# Patient Record
Sex: FEMALE | Race: BLACK OR AFRICAN AMERICAN | Employment: FULL TIME | URBAN - METROPOLITAN AREA
[De-identification: names, ages, dates, MRNs, and addresses within clinical notes are randomized per-mention and may not be internally consistent; named-entity substitution may affect disease eponyms.]

---

## 2017-04-17 ENCOUNTER — HOSPITAL ENCOUNTER (OUTPATIENT)
Dept: MAMMOGRAPHY | Age: 57
Discharge: HOME | End: 2017-04-17
Attending: FAMILY MEDICINE
Payer: COMMERCIAL

## 2017-04-17 DIAGNOSIS — Z12.31 VISIT FOR SCREENING MAMMOGRAM: ICD-10-CM

## 2017-04-17 PROCEDURE — 77067 SCR MAMMO BI INCL CAD: CPT

## 2017-05-22 ENCOUNTER — OFFICE VISIT (OUTPATIENT)
Dept: NEUROLOGY | Age: 57
End: 2017-05-22

## 2017-05-22 VITALS
HEART RATE: 84 BPM | SYSTOLIC BLOOD PRESSURE: 110 MMHG | HEIGHT: 63 IN | OXYGEN SATURATION: 98 % | BODY MASS INDEX: 25.87 KG/M2 | DIASTOLIC BLOOD PRESSURE: 60 MMHG | WEIGHT: 146 LBS

## 2017-05-22 DIAGNOSIS — G62.9 NEUROPATHY: ICD-10-CM

## 2017-05-22 DIAGNOSIS — G62.9 NEUROPATHY: Primary | ICD-10-CM

## 2017-05-22 RX ORDER — GABAPENTIN 100 MG/1
100 CAPSULE ORAL 3 TIMES DAILY
Qty: 90 CAP | Refills: 3 | Status: SHIPPED | OUTPATIENT
Start: 2017-05-22 | End: 2017-09-05 | Stop reason: DRUGHIGH

## 2017-05-22 NOTE — MR AVS SNAPSHOT
Visit Information Date & Time Provider Department Dept. Phone Encounter #  
 5/22/2017 10:00 AM Anne Vásquez MD Neurology Clinic at [facility] [phone] [id number] Follow-up Instructions Return in about 3 months (around 8/22/2017). Your Appointments 5/22/2017 10:00 AM  
New Patient with Anne Vásquez MD  
Neurology Clinic at [facility]: np. ..neuropathy. Sander Werner; np. ..neuropathy. Jo Galarza 1957 [facility], 
[address] [place] [address], [address] [phone]  
[address] [facility], [address] [place] [address], [address] [address] Upcoming Health Maintenance Date Due Hepatitis C Screening 1960 Pneumococcal 19-64 Highest Risk (1 of 3 - PCV13) 3/8/1977 DTaP/Tdap/Td series (1 - Tdap) 3/8/1979 PAP AKA CERVICAL CYTOLOGY 3/8/1979 FOBT Q 1 YEAR AGE 50-75 3/8/2008 INFLUENZA AGE 9 TO ADULT 8/1/2017 BREAST CANCER SCRN MAMMOGRAM 4/17/2019 Allergies as of 5/22/2017  Review Complete On: 5/22/2017 By: Anne Vásquez MD  
 No Known Allergies Current Immunizations  Never Reviewed No immunizations on file. Not reviewed this visit You Were Diagnosed With   
  
 Codes Comments Neuropathy    -  Primary ICD-10-CM: G62.9 ICD-9-CM: 104. 9 Vitals BP Pulse Height(growth percentile) Weight(growth percentile) SpO2 BMI  
 110/60 84 5' 3\" (1.6 m) 146 lb (66.2 kg) 98% 25.86 kg/m2 OB Status Smoking Status Hysterectomy Never Smoker Vitals History BMI and BSA Data Body Mass Index Body Surface Area  
 25.86 kg/m 2 1.72 m 2 Preferred Pharmacy Pharmacy Name Phone [phone], [address] [phone] Your Updated Medication List  
  
   
This list is accurate as of: 5/22/17  9:53 AM.  Always use your most recent med list.  
  
  
  
  
 gabapentin 100 mg capsule Commonly known as:  NEURONTIN Take 1 Cap by mouth three (3) times daily. Prescriptions Printed Refills  
 gabapentin (NEURONTIN) 100 mg capsule 3 Sig: Take 1 Cap by mouth three (3) times daily. Class: Print Route: Oral  
  
We Performed the Following CELL COUNT, CSF [id number] CPT(R)] CULTURE, BODY FLUID Dorrine Albert STAIN [92034 CPT(R)] PROTEIN, CSF [id number] CPT(R)] VDRL, CSF [id number] CPT(R)] Follow-up Instructions Return in about 3 months (around 8/22/2017). To-Do List   
 05/22/2017 Lab:  CELL COUNT, CSF   
  
 05/23/2017 Lab:  GLUCOSE, CSF   
  
 05/23/2017 Imaging:  XR SPINAL PUNC LUMB DX Referral Information Referral ID Referred By Referred To  
  
 [id number] Senait Azevedo Not Available Visits Status Start Date End Date 1 New Request 5/22/17 5/22/18 If your referral has a status of pending review or denied, additional information will be sent to support the outcome of this decision. Patient Instructions PRESCRIPTION REFILL POLICY [facility] Statement to Patients April 1, 2014 In an effort to ensure the large volume of patient prescription refills is processed in the most efficient and expeditious manner, we are asking our patients to assist us by calling your Pharmacy for all prescription refills, this will include also your  Mail Order Pharmacy. The pharmacy will contact our office electronically to continue the refill process. Please do not wait until the last minute to call your pharmacy. We need at least 48 hours (2days) to fill prescriptions. We also encourage you to call your pharmacy before going to  your prescription to make sure it is ready.   
 
With regard to controlled substance prescription refill requests (narcotic refills) that need to be picked up at our office, we ask your cooperation by providing us with at least 72 hours (3days) notice that you will need a refill. We will not refill narcotic prescription refill requests after 4:00pm on any weekday, Monday through Thursday, or after 2:00pm on Fridays, or on the weekends. We encourage everyone to explore another way of getting your prescription refill request processed using DesignLine, our patient web portal through our electronic medical record system. DesignLine is an efficient and effective way to communicate your medication request directly to the office and  downloadable as an dejan on your smart phone . DesignLine also features a review functionality that allows you to view your medication list as well as leave messages for your physician. Are you ready to get connected? If so please review the attatched instructions or speak to any of our staff to get you set up right away! Thank you so much for your cooperation. Should you have any questions please contact our Practice Administrator. The Physicians and Staff,  [facility] Introducing [place]! [facility] introduces DesignLine patient portal. Now you can access parts of your medical record, email your doctor's office, and request medication refills online. 1. In your internet browser, go to [email or web address] 2. Click on the First Time User? Click Here link in the Sign In box. You will see the New Member Sign Up page. 3. Enter your DesignLine Access Code exactly as it appears below. You will not need to use this code after youve completed the sign-up process. If you do not sign up before the expiration date, you must request a new code. · DesignLine Access Code: [email or web address] Expires: 6/21/2017  1:10 PM 
 
4. Enter the last four digits of your Social Security Number (xxxx) and Date of Birth (mm/dd/yyyy) as indicated and click Submit. You will be taken to the next sign-up page. 5. Create a ReplyBuy ID. This will be your ReplyBuy login ID and cannot be changed, so think of one that is secure and easy to remember. 6. Create a ReplyBuy password. You can change your password at any time. 7. Enter your Password Reset Question and Answer. This can be used at a later time if you forget your password. 8. Enter your e-mail address. You will receive e-mail notification when new information is available in [address] 9. Click Sign Up. You can now view and download portions of your medical record. 10. Click the Download Summary menu link to download a portable copy of your medical information. If you have questions, please visit the Frequently Asked Questions section of the ReplyBuy website. Remember, ReplyBuy is NOT to be used for urgent needs. For medical emergencies, dial 911. Now available from your iPhone and Android! Please provide this summary of care documentation to your next provider. Your primary care clinician is listed as 535Kenyetta Adames. If you have any questions after today's visit, please call [phone].

## 2017-05-22 NOTE — PATIENT INSTRUCTIONS
[facility]   Statement to Patients  April 1, 2014      In an effort to ensure the large volume of patient prescription refills is processed in the most efficient and expeditious manner, we are asking our patients to assist us by calling your Pharmacy for all prescription refills, this will include also your  Mail Order Pharmacy. The pharmacy will contact our office electronically to continue the refill process. Please do not wait until the last minute to call your pharmacy. We need at least 48 hours (2days) to fill prescriptions. We also encourage you to call your pharmacy before going to  your prescription to make sure it is ready. With regard to controlled substance prescription refill requests (narcotic refills) that need to be picked up at our office, we ask your cooperation by providing us with at least 72 hours (3days) notice that you will need a refill. We will not refill narcotic prescription refill requests after 4:00pm on any weekday, Monday through Thursday, or after 2:00pm on Fridays, or on the weekends. We encourage everyone to explore another way of getting your prescription refill request processed using BOSS Metrics, our patient web portal through our electronic medical record system. B[employer] Metrics is an efficient and effective way to communicate your medication request directly to the office and  downloadable as an dejan on your smart phone . B[employer] also features a review functionality that allows you to view your medication list as well as leave messages for your physician. Are you ready to get connected? If so please review the attatched instructions or speak to any of our staff to get you set up right away! Thank you so much for your cooperation. Should you have any questions please contact our Practice Administrator.     The Physicians and Staff,  [facility]

## 2017-05-22 NOTE — PROGRESS NOTES
HISTORY OF PRESENT ILLNESS  Philip Gay is a 59 y.o. female. Numbness   Pertinent negatives include no focal weakness, no loss of sensation, no loss of balance, no memory loss, no movement disorder and no mental status change. Neurologic Problem   The history is provided by the patient. This is a chronic problem. Episode onset: 4-5 years. The problem has been gradually worsening. Affected Side: Ams and legs, worse distally. Pertinent negatives include no focal weakness, no loss of sensation, no loss of balance, no memory loss, no movement disorder and no mental status change. There has been no fever. These pains can occur day or night but worse at night, they seem to be slowly worsening. Patient and daughter report no change in mentation    Review of Systems   Constitutional: Negative. Eyes: Negative. Cardiovascular: Negative. Gastrointestinal: Negative. Genitourinary: Negative. Skin: Negative. Neurological: Positive for numbness. Negative for dizziness, tingling, sensory change, speech change, focal weakness, seizures and loss of balance. Endo/Heme/Allergies: Negative. Psychiatric/Behavioral: Negative. Negative for memory loss. History reviewed. No pertinent past medical history. Family History   Problem Relation Age of Onset    Dementia Mother     Parkinsonism Mother     Migraines Father      Social History     Social History    Marital status:      Spouse name: N/A    Number of children: N/A    Years of education: N/A     Occupational History    Not on file. Social History Main Topics    Smoking status: Never Smoker    Smokeless tobacco: Never Used    Alcohol use No    Drug use: No    Sexual activity: No     Other Topics Concern    Not on file     Social History Narrative     Physical Exam   Constitutional: She appears well-developed and well-nourished. HENT:   Head: Normocephalic.    Eyes: Conjunctivae and EOM are normal. Pupils are equal, round, and reactive to light. Neck: Normal range of motion. Neck supple. Cardiovascular: Normal rate, regular rhythm and normal heart sounds. Pulmonary/Chest: Effort normal.   Musculoskeletal: Normal range of motion. Neurological: She is alert. No cranial nerve deficit or sensory deficit. Coordination and gait normal.   Reflex Scores:       Tricep reflexes are 2+ on the right side and 2+ on the left side. Bicep reflexes are 2+ on the right side and 2+ on the left side. Brachioradialis reflexes are 2+ on the right side and 2+ on the left side. Patellar reflexes are 3+ on the right side and 3+ on the left side. Achilles reflexes are 3+ on the right side and 3+ on the left side. Babinski is equivocal but abnormal bilaterally  Fundi normal  Carotids w/o bruit   Skin: Skin is warm and dry. Psychiatric: She has a normal mood and affect. Her behavior is normal. Judgment and thought content normal.       ASSESSMENT and PLAN  This patient may have non-specific neuropathic pains but the description of intermittent shooting pains i a patient with a history of syphillis is concerning. I will set her up for an LP in Radiology to check CSF protien, glucose, cell counts and VDRL. I will start her on gabapentin 100 tid to see if that altyers pains, F/U 2 months.

## 2017-05-23 DIAGNOSIS — G62.9 NEUROPATHY: ICD-10-CM

## 2017-07-28 ENCOUNTER — HOSPITAL ENCOUNTER (OUTPATIENT)
Dept: GENERAL RADIOLOGY | Age: 57
Discharge: HOME | End: 2017-07-28
Attending: PSYCHIATRY & NEUROLOGY
Payer: COMMERCIAL

## 2017-07-28 VITALS
RESPIRATION RATE: 16 BRPM | OXYGEN SATURATION: 98 % | DIASTOLIC BLOOD PRESSURE: 72 MMHG | TEMPERATURE: 98.4 F | SYSTOLIC BLOOD PRESSURE: 127 MMHG | HEART RATE: 56 BPM

## 2017-07-28 DIAGNOSIS — G62.9 NEUROPATHY: ICD-10-CM

## 2017-07-28 LAB
CHARACTER SMN: CLEAR
CHARACTER SMN: CLEAR
COLOR SPUN CSF: COLORLESS
COLOR SPUN CSF: COLORLESS
GLUCOSE CSF-MCNC: 43 MG/DL (ref 40–70)
PROT CSF-MCNC: 38 MG/DL (ref 15–45)
RBC # CSF: 2 /CU MM
RBC # CSF: 21 /CU MM
TUBE # CSF: 1
TUBE # CSF: 2
TUBE # CSF: 2
TUBE # CSF: 3
WBC # CSF: 1 /CU MM (ref 0–5)
WBC # CSF: 3 /CU MM (ref 0–5)

## 2017-07-28 PROCEDURE — 86592 SYPHILIS TEST NON-TREP QUAL: CPT | Performed by: PSYCHIATRY & NEUROLOGY

## 2017-07-28 PROCEDURE — 87205 SMEAR GRAM STAIN: CPT | Performed by: PSYCHIATRY & NEUROLOGY

## 2017-07-28 PROCEDURE — 82945 GLUCOSE OTHER FLUID: CPT | Performed by: PSYCHIATRY & NEUROLOGY

## 2017-07-28 PROCEDURE — 62270 DX LMBR SPI PNXR: CPT

## 2017-07-28 PROCEDURE — 89050 BODY FLUID CELL COUNT: CPT | Performed by: PSYCHIATRY & NEUROLOGY

## 2017-07-28 PROCEDURE — 84157 ASSAY OF PROTEIN OTHER: CPT | Performed by: PSYCHIATRY & NEUROLOGY

## 2017-07-28 NOTE — IP AVS SNAPSHOT
[address] [facility] 
[phone] Patient: Darcie Boyer MRN: [id number] MXI:0[birth year] Current Discharge Medication List  
  
ASK your doctor about these medications Dose & Instructions Dispensing Information Comments Morning Noon Evening Bedtime  
 gabapentin 100 mg capsule Commonly known as:  NEURONTIN Your last dose was: Your next dose is:    
   
   
 Dose:  100 mg Take 1 Cap by mouth three (3) times daily. Quantity:  90 Cap Refills:  3

## 2017-07-28 NOTE — IP AVS SNAPSHOT
[address] [facility] 
[phone] Patient: Tonie Moore MRN: [id number] ZUQ:[birth year] You are allergic to the following No active allergies Recent Documentation OB Status Smoking Status Hysterectomy Never Smoker Unresulted Labs Order Current Status CELL COUNT, CSF In process CELL COUNT, CSF In process GLUCOSE, CSF In process PROTEIN, CSF In process Emergency Contacts Name Discharge Info Relation Home Work Mobile [place], [place]     [phone] About your hospitalization You were admitted on:  July 28, 2017 You last received care in the:  [facility] RADIOLOGY You were discharged on:  July 28, 2017 Unit phone number:  [phone] Why you were hospitalized Your primary diagnosis was:  Not on File Providers Seen During Your Hospitalizations Provider Role Specialty Primary office phone Yoan Gray MD Attending Provider Neurology [phone] Your Primary Care Physician (PCP) Primary Care Physician Office Phone Office Fax Lisette Lombardo [phone] Follow-up Information None Your Appointments Monday August 21, 2017  8:40 AM EDT Follow Up with Yoan Gray MD  
Neurology Clinic at [address], 
[address] [place] [address], [address]  
[phone] Current Discharge Medication List  
  
ASK your doctor about these medications Dose & Instructions Dispensing Information Comments Morning Noon Evening Bedtime  
 gabapentin 100 mg capsule Commonly known as:  NEURONTIN Your last dose was: Your next dose is:    
   
   
 Dose:  100 mg Take 1 Cap by mouth three (3) times daily. Quantity:  90 Cap Refills:  3 Discharge Instructions Lumbar Puncture: After Your Visit Your Care Instructions A lumbar puncture (also called a spinal tap) is a test to check the fluid that surrounds and protects your spinal cord and brain. Your doctor may have done this test to look for an infection. In some cases, a lumbar puncture is done to release pressure from too much fluid or to look for diseases such as multiple sclerosis. The fluid that was taken is often sent to a lab for different tests. Your doctor may get some answers right away, but other answers take hours to days. Your doctor will call you with the results. You may feel tired or have a mild backache or a headache after the test. Some people have trouble sleeping for 1 or 2 days. Follow-up care is a key part of your treatment and safety. Be sure to make and go to all appointments, and call your doctor if you are having problems. Its also a good idea to know your test results and keep a list of the medicines you take. How can you care for yourself at home? · Drink plenty of liquids in the next few hours. This may prevent a headache or keep a headache from being severe. · Your doctor may tell you to lie flat in bed for 1 to 4 hours. This may prevent a headache. · Get plenty of rest. 
· If your doctor prescribed antibiotics, take them as directed. Do not stop taking them just because you feel better. You need to take the full course of antibiotics. · Take anti-inflammatory medicines to reduce a headache or backache. These include ibuprofen (Advil, Motrin) and naproxen (Aleve). Read and follow all instructions on the label. When should you call for help? Call your doctor now or seek immediate medical care if: 
· You have a fever with a stiff neck or a severe headache. · You have any drainage or bleeding from the site of the puncture. · You feel numb or lose strength below the puncture site.  
Watch closely for changes in your health, and be sure to contact your doctor if: 
· You do not get better as expected. Where can you learn more? Go to DealEt3arrafer.be Enter 40-88-52-31 in the search box to learn more about \"Lumbar Puncture: After Your Visit. \"  
© 9157-2720 Healthwise, Incorporated. Care instructions adapted under license by Eleni Claudio (which disclaims liability or warranty for this information). This care instruction is for use with your licensed healthcare professional. If you have questions about a medical condition or this instruction, always ask your healthcare professional. [address] any warranty or liability for your use of this information. Content Version: 7.3.436660; Last Revised: September 13, 2011 Discharge Orders None Introducing [address]! Eleni Claudio introduces PredictionIO patient portal. Now you can access parts of your medical record, email your doctor's office, and request medication refills online. 1. In your internet browser, go to [email or web address] 2. Click on the First Time User? Click Here link in the Sign In box. You will see the New Member Sign Up page. 3. Enter your PredictionIO Access Code exactly as it appears below. You will not need to use this code after youve completed the sign-up process. If you do not sign up before the expiration date, you must request a new code. · PredictionIO Access Code: [email or web address] Expires: 10/25/2017 11:18 AM 
 
4. Enter the last four digits of your Social Security Number (xxxx) and Date of Birth (mm/dd/yyyy) as indicated and click Submit. You will be taken to the next sign-up page. 5. Create a PredictionIO ID. This will be your PredictionIO login ID and cannot be changed, so think of one that is secure and easy to remember. 6. Create a PredictionIO password. You can change your password at any time. 7. Enter your Password Reset Question and Answer. This can be used at a later time if you forget your password. 8. Enter your e-mail address. You will receive e-mail notification when new information is available in [address] 9. Click Sign Up. You can now view and download portions of your medical record. 10. Click the Download Summary menu link to download a portable copy of your medical information. If you have questions, please visit the Frequently Asked Questions section of the KDS website. Remember, KDS is NOT to be used for urgent needs. For medical emergencies, dial 911. Now available from your iPhone and Android! General Information Please provide this summary of care documentation to your next provider. Patient Signature:  ____________________________________________________________ Date:  ____________________________________________________________  
  
[facility] Provider Signature:  ____________________________________________________________ Date:  ____________________________________________________________

## 2017-07-28 NOTE — ROUTINE PROCESS
0945- Pt in post LP. Drsg D/I to back. Labs drawn and sent. Discharge instructions reviewed with pt. Pt verbalized understanding. 1083- Discharged to home via W/C. Dischage instructions reviewed with Dtr.

## 2017-07-28 NOTE — DISCHARGE INSTRUCTIONS
Lumbar Puncture: After Your Visit  Your Care Instructions  A lumbar puncture (also called a spinal tap) is a test to check the fluid that surrounds and protects your spinal cord and brain. Your doctor may have done this test to look for an infection. In some cases, a lumbar puncture is done to release pressure from too much fluid or to look for diseases such as multiple sclerosis. The fluid that was taken is often sent to a lab for different tests. Your doctor may get some answers right away, but other answers take hours to days. Your doctor will call you with the results. You may feel tired or have a mild backache or a headache after the test. Some people have trouble sleeping for 1 or 2 days. Follow-up care is a key part of your treatment and safety. Be sure to make and go to all appointments, and call your doctor if you are having problems. Its also a good idea to know your test results and keep a list of the medicines you take. How can you care for yourself at home? · Drink plenty of liquids in the next few hours. This may prevent a headache or keep a headache from being severe. · Your doctor may tell you to lie flat in bed for 1 to 4 hours. This may prevent a headache. · Get plenty of rest.  · If your doctor prescribed antibiotics, take them as directed. Do not stop taking them just because you feel better. You need to take the full course of antibiotics. · Take anti-inflammatory medicines to reduce a headache or backache. These include ibuprofen (Advil, Motrin) and naproxen (Aleve). Read and follow all instructions on the label. When should you call for help? Call your doctor now or seek immediate medical care if:  · You have a fever with a stiff neck or a severe headache. · You have any drainage or bleeding from the site of the puncture. · You feel numb or lose strength below the puncture site.   Watch closely for changes in your health, and be sure to contact your doctor if:  · You do not get better as expected. Where can you learn more? Go to Venustech.be  Enter B775 in the search box to learn more about \"Lumbar Puncture: After Your Visit. \"   © 0915-4286 Healthwise, Incorporated. Care instructions adapted under license by Meera Elizabeth (which disclaims liability or warranty for this information). This care instruction is for use with your licensed healthcare professional. If you have questions about a medical condition or this instruction, always ask your healthcare professional. [place] [address] any warranty or liability for your use of this information.   Content Version: 6.6.508688; Last Revised: September 13, 2011

## 2017-07-31 LAB — REAGIN AB CSF QL: NON REACTIVE

## 2017-08-04 LAB
BACTERIA SPEC CULT: NORMAL
BACTERIA SPEC CULT: NORMAL
GRAM STN SPEC: NORMAL
SERVICE CMNT-IMP: NORMAL

## 2017-09-05 ENCOUNTER — OFFICE VISIT (OUTPATIENT)
Dept: NEUROLOGY | Age: 57
End: 2017-09-05

## 2017-09-05 VITALS
DIASTOLIC BLOOD PRESSURE: 70 MMHG | HEART RATE: 76 BPM | SYSTOLIC BLOOD PRESSURE: 140 MMHG | BODY MASS INDEX: 26.58 KG/M2 | HEIGHT: 63 IN | OXYGEN SATURATION: 98 % | WEIGHT: 150 LBS

## 2017-09-05 DIAGNOSIS — M79.2 NEUROPATHIC PAIN: Primary | ICD-10-CM

## 2017-09-05 PROBLEM — G57.93 NEUROPATHIC PAIN OF BOTH LEGS: Status: ACTIVE | Noted: 2017-09-05

## 2017-09-05 RX ORDER — GABAPENTIN 100 MG/1
CAPSULE ORAL
Qty: 90 CAP | Refills: 5 | Status: SHIPPED | OUTPATIENT
Start: 2017-09-05 | End: 2021-11-05

## 2017-09-05 NOTE — PATIENT INSTRUCTIONS
10 [facility]   Statement to Patients  April 1, 2014      In an effort to ensure the large volume of patient prescription refills is processed in the most efficient and expeditious manner, we are asking our patients to assist us by calling your Pharmacy for all prescription refills, this will include also your  Mail Order Pharmacy. The pharmacy will contact our office electronically to continue the refill process. Please do not wait until the last minute to call your pharmacy. We need at least 48 hours (2days) to fill prescriptions. We also encourage you to call your pharmacy before going to  your prescription to make sure it is ready. With regard to controlled substance prescription refill requests (narcotic refills) that need to be picked up at our office, we ask your cooperation by providing us with at least 72 hours (3days) notice that you will need a refill. We will not refill narcotic prescription refill requests after 4:00pm on any weekday, Monday through Thursday, or after 2:00pm on Fridays, or on the weekends. We encourage everyone to explore another way of getting your prescription refill request processed using [employer], our patient web portal through our electronic medical record system. [employer] is an efficient and effective way to communicate your medication request directly to the office and  downloadable as an dejan on your smart phone . [employer] also features a review functionality that allows you to view your medication list as well as leave messages for your physician. Are you ready to get connected? If so please review the attatched instructions or speak to any of our staff to get you set up right away! Thank you so much for your cooperation. Should you have any questions please contact our Practice Administrator.     The Physicians and Staff,  [facility] Neurology [facility]

## 2017-09-05 NOTE — MR AVS SNAPSHOT
Visit Information Date & Time Provider Department Dept. Phone Encounter #  
 9/5/2017  9:40 AM Javier Pablo MD [facility] [phone] [id number] Follow-up Instructions Return in about 3 months (around 12/5/2017). Upcoming Health Maintenance Date Due Hepatitis C Screening 1960 Pneumococcal 19-64 Highest Risk (1 of 3 - PCV13) 3/8/1977 DTaP/Tdap/Td series (1 - Tdap) 3/8/1979 PAP AKA CERVICAL CYTOLOGY 3/8/1979 FOBT Q 1 YEAR AGE 50-75 3/8/2008 INFLUENZA AGE 9 TO ADULT 8/1/2017 BREAST CANCER SCRN MAMMOGRAM 4/17/2019 Allergies as of 9/5/2017  Review Complete On: 9/5/2017 By: Dioni Grimm LPN No Known Allergies Current Immunizations  Never Reviewed No immunizations on file. Not reviewed this visit Vitals BP Pulse Height(growth percentile) Weight(growth percentile) SpO2 BMI  
 140/70 76 5' 3\" (1.6 m) 150 lb (68 kg) 98% 26.57 kg/m2 OB Status Smoking Status Hysterectomy Never Smoker Vitals History BMI and BSA Data Body Mass Index Body Surface Area  
 26.57 kg/m 2 1.74 m 2 Preferred Pharmacy Pharmacy Name Phone [phone] [phone] Your Updated Medication List  
  
   
This list is accurate as of: 9/5/17  9:56 AM.  Always use your most recent med list.  
  
  
  
  
 gabapentin 100 mg capsule Commonly known as:  NEURONTIN Take 2 or 3 3 times a day as needed for leg pain. Indications: NEUROPATHIC PAIN Prescriptions Sent to Pharmacy Refills  
 gabapentin (NEURONTIN) 100 mg capsule 5 Sig: Take 2 or 3 3 times a day as needed for leg pain. Indications: NEUROPATHIC PAIN Class: Normal  
 Pharmacy: [address]  #: [phone] Follow-up Instructions Return in about 3 months (around 12/5/2017). Patient Instructions PRESCRIPTION REFILL POLICY [facility] Statement to Patients April 1, 2014 In an effort to ensure the large volume of patient prescription refills is processed in the most efficient and expeditious manner, we are asking our patients to assist us by calling your Pharmacy for all prescription refills, this will include also your  Mail Order Pharmacy. The pharmacy will contact our office electronically to continue the refill process. Please do not wait until the last minute to call your pharmacy. We need at least 48 hours (2days) to fill prescriptions. We also encourage you to call your pharmacy before going to  your prescription to make sure it is ready. With regard to controlled substance prescription refill requests (narcotic refills) that need to be picked up at our office, we ask your cooperation by providing us with at least 72 hours (3days) notice that you will need a refill. We will not refill narcotic prescription refill requests after 4:00pm on any weekday, Monday through Thursday, or after 2:00pm on Fridays, or on the weekends. We encourage everyone to explore another way of getting your prescription refill request processed using Common[employer], our patient web portal through our electronic medical record system. Common[employer] is an efficient and effective way to communicate your medication request directly to the office and  downloadable as an dejan on your smart phone . Common[employer] also features a review functionality that allows you to view your medication list as well as leave messages for your physician. Are you ready to get connected? If so please review the attatched instructions or speak to any of our staff to get you set up right away! Thank you so much for your cooperation. Should you have any questions please contact our Practice Administrator. The Physicians and Staff,  [facility] SERVICES! [employer] introduces [employer] Esc[employer] patient portal. Now you can access parts of your medical record, email your doctor's office, and request medication refills online. 1. In your internet browser, go to [email or web address] 2. Click on the First Time User? Click Here link in the Sign In box. You will see the New Member Sign Up page. 3. Enter your Secret Escapes Access Code exactly as it appears below. You will not need to use this code after youve completed the sign-up process. If you do not sign up before the expiration date, you must request a new code. · [employer] Access Code: [email or web address] Expires: 10/25/2017 11:18 AM 
 
4. Enter the last four digits of your Social Security Number (xxxx) and Date of Birth (mm/dd/yyyy) as indicated and click Submit. You will be taken to the next sign-up page. 5. Create a Secret Escapes ID. This will be your Secret Escapes login ID and cannot be changed, so think of one that is secure and easy to remember. 6. Create a [employer] password. You can change your password at any time. 7. Enter your Password Reset Question and Answer. This can be used at a later time if you forget your password. 8. Enter your e-mail address. You will receive e-mail notification when new information is available in [address] 9. Click Sign Up. You can now view and download portions of your medical record. 10. Click the Download Summary menu link to download a portable copy of your medical information. If you have questions, please visit the Frequently Asked Questions section of the Secret Escapes website. Remember, [employer] Escapes is NOT to be used for urgent needs. For medical emergencies, dial 911. Now available from your iPhone and Android! Please provide this summary of care documentation to your next provider. Your primary care clinician is listed as Wilder Adames. If you have any questions after today's visit, please call [phone].

## 2017-09-05 NOTE — PROGRESS NOTES
HISTORY OF PRESENT ILLNESS  Gertrude Peraza is a 59 y.o. female. HPI Comments: Raciel Acosta is a 49-year-old woman who comes in today for follow-up of episodic pain starting at her knees and shooting down into her feet. They occur during the day and at night perhaps a little worse at night, they will wake her up at night, going on for about 4-5 years. The problem is symmetric. She has been taking gabapentin 100 mg 3 times daily with some benefit over the pain still come . She denies any side effects to the medication. She denies any bowel or bladder problems. When I first saw her I noted she had a history of syphilis. Lumbar puncture revealed negative RPR with no cells and normal protein, there is no evidence of neurosyphilis. She denies any bowel or bladder complaints. Her reflexes were slightly brisk in her lower extremities with equivocal toes. Numbness   The history is provided by the patient. This is a chronic problem. Pertinent negatives include no focal weakness. Pertinent negatives include no headaches. Review of Systems   Constitutional: Negative. Neurological: Positive for numbness. Negative for dizziness, tingling, tremors, speech change, focal weakness, seizures, loss of consciousness and headaches. Psychiatric/Behavioral: Negative. Physical Exam   Constitutional: She is oriented to person, place, and time. She appears well-developed and well-nourished. No distress. Musculoskeletal: Normal range of motion. She exhibits no edema, tenderness or deformity. Neurological: She is alert and oriented to person, place, and time. She still has brisk reflexes at her knees and ankles however her toes still are equivocal as well. Her gait is normal   Skin: Skin is warm and dry. No rash noted. She is not diaphoretic. No erythema. No pallor. Psychiatric: She has a normal mood and affect.  Her behavior is normal. Judgment and thought content normal.   Vitals reviewed. ASSESSMENT and PLAN  SHOOTING PAINS IN LEGS BELOW KNEE  This patient's workup to consider neurosyphilis was negative including spinal fluid VDRL, protein and cell count. To increase her gabapentin to 203 times a day and then she can increase to 303 times a day if does not do the job. I will plan to see her back in 3 months. I will follow her lower extremity exam and if it changes to indicate any evidence of spasticity or with any bowel or bladder complaints I will then image her spine. Not sure what the cause for this neuropathic pain but I think it is benign at this point    This note will not be viewable in MyChart.

## 2018-04-20 ENCOUNTER — HOSPITAL ENCOUNTER (OUTPATIENT)
Dept: MAMMOGRAPHY | Age: 58
Discharge: HOME | End: 2018-04-20
Attending: FAMILY MEDICINE
Payer: COMMERCIAL

## 2018-04-20 DIAGNOSIS — Z12.39 SCREENING BREAST EXAMINATION: ICD-10-CM

## 2018-04-20 PROCEDURE — 77067 SCR MAMMO BI INCL CAD: CPT

## 2018-06-06 ENCOUNTER — HOSPITAL ENCOUNTER (OUTPATIENT)
Dept: GENERAL RADIOLOGY | Age: 58
Discharge: HOME | End: 2018-06-06
Attending: INTERNAL MEDICINE
Payer: COMMERCIAL

## 2018-06-06 DIAGNOSIS — D47.2 MONOCLONAL GAMMOPATHY: ICD-10-CM

## 2018-06-06 PROCEDURE — 77075 RADEX OSSEOUS SURVEY COMPL: CPT

## 2019-04-24 ENCOUNTER — HOSPITAL ENCOUNTER (OUTPATIENT)
Dept: MAMMOGRAPHY | Age: 59
Discharge: HOME | End: 2019-04-24
Attending: FAMILY MEDICINE
Payer: COMMERCIAL

## 2019-04-24 DIAGNOSIS — Z12.39 BREAST SCREENING: ICD-10-CM

## 2019-04-24 PROCEDURE — 77063 BREAST TOMOSYNTHESIS BI: CPT

## 2020-05-18 ENCOUNTER — HOSPITAL ENCOUNTER (OUTPATIENT)
Dept: MAMMOGRAPHY | Age: 60
Discharge: HOME | End: 2020-05-18
Attending: FAMILY MEDICINE
Payer: COMMERCIAL

## 2020-05-18 DIAGNOSIS — Z12.31 VISIT FOR SCREENING MAMMOGRAM: ICD-10-CM

## 2020-05-18 PROCEDURE — 77063 BREAST TOMOSYNTHESIS BI: CPT

## 2020-09-29 ENCOUNTER — TRANSCRIBE ORDER (OUTPATIENT)
Dept: SCHEDULING | Age: 60
End: 2020-09-29

## 2020-09-29 DIAGNOSIS — D47.2 GAMMOPATHY, MONOCLONAL: Primary | ICD-10-CM

## 2021-01-21 ENCOUNTER — OFFICE VISIT (OUTPATIENT)
Dept: PRIMARY CARE CLINIC | Age: 61
End: 2021-01-21

## 2021-01-21 VITALS
HEIGHT: 63 IN | BODY MASS INDEX: 26.58 KG/M2 | SYSTOLIC BLOOD PRESSURE: 102 MMHG | HEART RATE: 67 BPM | WEIGHT: 150 LBS | TEMPERATURE: 97.9 F | DIASTOLIC BLOOD PRESSURE: 67 MMHG | RESPIRATION RATE: 16 BRPM | OXYGEN SATURATION: 98 %

## 2021-01-21 DIAGNOSIS — L73.9 FOLLICULITIS: Primary | ICD-10-CM

## 2021-01-21 PROCEDURE — 99203 OFFICE O/P NEW LOW 30 MIN: CPT | Performed by: NURSE PRACTITIONER

## 2021-01-21 RX ORDER — CEPHALEXIN 250 MG/1
250 CAPSULE ORAL 4 TIMES DAILY
Qty: 28 CAP | Refills: 0 | Status: SHIPPED | OUTPATIENT
Start: 2021-01-21 | End: 2021-01-28

## 2021-01-21 RX ORDER — ACETAMINOPHEN 500 MG
TABLET ORAL
COMMUNITY
End: 2021-11-05

## 2021-01-21 NOTE — PROGRESS NOTES
HISTORY OF PRESENT ILLNESS  Nahed Painter is a 56 y.o. female. Patient reports left ear pain x 2 days. She feels like the canal is swollen and it is tender to touch. She tried putting in peroxide, which didn't really help. No hearing changes. No dizziness. No congestion. Visit Vitals  /67   Pulse 67   Temp 97.9 °F (36.6 °C) (Skin)   Resp 16   Ht 5' 3\" (1.6 m)   Wt 150 lb (68 kg)   SpO2 98%   BMI 26.57 kg/m²       HPI    Review of Systems   HENT: Positive for ear pain. Physical Exam  Constitutional:       Appearance: Normal appearance. HENT:      Right Ear: Tympanic membrane normal.      Left Ear: Tympanic membrane normal.      Ears:      Comments: Left ear canal with inflamed, erythematous hair follicle; very tender to palpation, canal swollen  Skin:     General: Skin is warm and dry. Neurological:      General: No focal deficit present. Mental Status: She is alert and oriented to person, place, and time. Psychiatric:         Mood and Affect: Mood normal.         Behavior: Behavior normal.         ASSESSMENT and PLAN    ICD-10-CM ICD-9-CM    1.  Folliculitis  B10.8 345.2      Orders Placed This Encounter    cholecalciferol (VITAMIN D3) (2,000 UNITS /50 MCG) cap capsule    cephALEXin (KEFLEX) 250 mg capsule   antibiotic as prescribed  Follow up with pcp if no improvement over the next 3 days

## 2021-01-21 NOTE — PROGRESS NOTES
Chief Complaint   Patient presents with    Ear Pain     Patient in room #4 with complaints of ear pain, left side, starting yesterday

## 2021-01-26 ENCOUNTER — OFFICE VISIT (OUTPATIENT)
Dept: PRIMARY CARE CLINIC | Age: 61
End: 2021-01-26

## 2021-01-26 VITALS
WEIGHT: 149.2 LBS | HEIGHT: 63 IN | TEMPERATURE: 96.3 F | BODY MASS INDEX: 26.44 KG/M2 | HEART RATE: 65 BPM | DIASTOLIC BLOOD PRESSURE: 62 MMHG | RESPIRATION RATE: 16 BRPM | SYSTOLIC BLOOD PRESSURE: 100 MMHG | OXYGEN SATURATION: 99 %

## 2021-01-26 DIAGNOSIS — R21 RASH AND NONSPECIFIC SKIN ERUPTION: Primary | ICD-10-CM

## 2021-01-26 PROCEDURE — 99213 OFFICE O/P EST LOW 20 MIN: CPT | Performed by: FAMILY MEDICINE

## 2021-01-26 RX ORDER — ASCORBIC ACID 500 MG
500 TABLET ORAL DAILY
COMMUNITY

## 2021-01-26 NOTE — PROGRESS NOTES
Chief Complaint   Patient presents with    Rash     Patient in room #4 with complaints of noting rash after using ATB recently prescribed

## 2021-01-26 NOTE — PROGRESS NOTES
HISTORY OF PRESENT ILLNESS  Karla Abbott is a 56 y.o. female. HPI  Presents for rash on left forearm and shoulder that started yesterday. Feels itchy. Not spreading or worsening. She has been on cephalexin 250 mg since 6/28 for folliculitis of left ear canal. Reports ear feels itchy but no pain. Denies any other new meds or skin care products or foods. She is unsure whether she had cephalexin in the past.     Review of Systems   Constitutional: Negative for fever. HENT: Negative for ear discharge and ear pain. Skin: Positive for itching and rash. History reviewed. No pertinent past medical history. Past Surgical History:   Procedure Laterality Date    HX GYN  2004    HX HYSTERECTOMY       Social History     Socioeconomic History    Marital status:      Spouse name: Not on file    Number of children: Not on file    Years of education: Not on file    Highest education level: Not on file   Tobacco Use    Smoking status: Never Smoker    Smokeless tobacco: Never Used   Substance and Sexual Activity    Alcohol use: No    Drug use: No    Sexual activity: Never     Family History   Problem Relation Age of Onset    Dementia Mother     Parkinsonism Mother     Migraines Father      Current Outpatient Medications on File Prior to Visit   Medication Sig Dispense Refill    ascorbic acid, vitamin C, (Vitamin C) 500 mg tablet Take 500 mg by mouth daily.  zinc sulfate (ZINC-15 PO) Take 1 Tab by mouth daily.  cholecalciferol (VITAMIN D3) (2,000 UNITS /50 MCG) cap capsule Take  by mouth.  cephALEXin (KEFLEX) 250 mg capsule Take 1 Cap by mouth four (4) times daily for 7 days. 28 Cap 0    gabapentin (NEURONTIN) 100 mg capsule Take 2 or 3 3 times a day as needed for leg pain. Indications: NEUROPATHIC PAIN 90 Cap 5     No current facility-administered medications on file prior to visit. No Known Allergies    Physical Exam  Vitals signs and nursing note reviewed.    Constitutional: General: She is not in acute distress. Appearance: Normal appearance. Comments: /62   Pulse 65   Temp (!) 96.3 °F (35.7 °C) (Skin)   Resp 16   Ht 5' 3\" (1.6 m)   Wt 149 lb 3.2 oz (67.7 kg)   SpO2 99%   BMI 26.43 kg/m²    HENT:      Head: Normocephalic and atraumatic. Left Ear: Tympanic membrane, ear canal and external ear normal. There is no impacted cerumen. Neck:      Musculoskeletal: Neck supple. Pulmonary:      Effort: Pulmonary effort is normal. No respiratory distress. Skin:     General: Skin is warm and dry. Findings: Rash (few erythematous papules on left forearm and left neck/shoulder area) present. Neurological:      Mental Status: She is alert. ASSESSMENT and PLAN    ICD-10-CM ICD-9-CM    1. Rash and nonspecific skin eruption  R21 782.1      Mild erythematous pruritic papules on LUE x1 day while on cephalexin for ear canal folliculitis, doesn't sound like classic drug rash but since she finished 5 days and no signs of persistent folliculitis advised to discontinue antibiotic, can use zyrtec prn for itching, may discuss allergy testing with pcp in future to check if she's allergic to cephalexin. Return if rash worsening or new symptoms.     Ruben Tinsley MD  1/26/2021

## 2021-05-14 ENCOUNTER — TRANSCRIBE ORDER (OUTPATIENT)
Dept: SCHEDULING | Age: 61
End: 2021-05-14

## 2021-05-14 DIAGNOSIS — Z12.31 VISIT FOR SCREENING MAMMOGRAM: Primary | ICD-10-CM

## 2021-07-06 ENCOUNTER — HOSPITAL ENCOUNTER (OUTPATIENT)
Dept: MAMMOGRAPHY | Age: 61
Discharge: HOME | End: 2021-07-06
Attending: FAMILY MEDICINE
Payer: COMMERCIAL

## 2021-07-06 DIAGNOSIS — Z12.31 VISIT FOR SCREENING MAMMOGRAM: ICD-10-CM

## 2021-07-06 PROCEDURE — 77063 BREAST TOMOSYNTHESIS BI: CPT

## 2021-10-15 ENCOUNTER — TRANSCRIBE ORDER (OUTPATIENT)
Dept: SCHEDULING | Age: 61
End: 2021-10-15

## 2021-10-15 DIAGNOSIS — D47.2 MONOCLONAL GAMMOPATHY: Primary | ICD-10-CM

## 2021-11-05 ENCOUNTER — OFFICE VISIT (OUTPATIENT)
Dept: PRIMARY CARE CLINIC | Age: 61
End: 2021-11-05

## 2021-11-05 VITALS
HEART RATE: 53 BPM | DIASTOLIC BLOOD PRESSURE: 56 MMHG | SYSTOLIC BLOOD PRESSURE: 151 MMHG | OXYGEN SATURATION: 100 % | RESPIRATION RATE: 16 BRPM | WEIGHT: 150 LBS | HEIGHT: 63 IN | BODY MASS INDEX: 26.58 KG/M2 | TEMPERATURE: 97.6 F

## 2021-11-05 DIAGNOSIS — M79.10 MUSCLE PAIN: Primary | ICD-10-CM

## 2021-11-05 PROCEDURE — 99213 OFFICE O/P EST LOW 20 MIN: CPT | Performed by: NURSE PRACTITIONER

## 2021-11-05 RX ORDER — CYCLOBENZAPRINE HCL 10 MG
5 TABLET ORAL
Qty: 20 TABLET | Refills: 0 | Status: SHIPPED | OUTPATIENT
Start: 2021-11-05 | End: 2021-11-12

## 2021-11-05 RX ORDER — ASCORBIC ACID 500 MG
TABLET ORAL
COMMUNITY
End: 2021-11-05

## 2021-11-05 RX ORDER — IBUPROFEN 600 MG/1
600 TABLET ORAL
Qty: 40 TABLET | Refills: 0 | Status: SHIPPED | OUTPATIENT
Start: 2021-11-05 | End: 2021-11-15

## 2021-11-05 RX ORDER — GLUCOSAMINE SULFATE 1500 MG
POWDER IN PACKET (EA) ORAL
COMMUNITY

## 2021-11-05 NOTE — PROGRESS NOTES
Chief Complaint   Patient presents with    Neck Pain     Patient in room #5 with complaints of neck pain on left side starting last night, fatigued

## 2021-11-05 NOTE — PATIENT INSTRUCTIONS
Neck Pain: Care Instructions Your Care Instructions You can have neck pain anywhere from the bottom of your head to the top of your shoulders. It can spread to the upper back or arms. Injuries, painting a ceiling, sleeping with your neck twisted, staying in one position for too long, and many other activities can cause neck pain. Most neck pain gets better with home care. Your doctor may recommend medicine to relieve pain or relax your muscles. He or she may suggest exercise and physical therapy to increase flexibility and relieve stress. You may need to wear a special (cervical) collar to support your neck for a day or two. Follow-up care is a key part of your treatment and safety. Be sure to make and go to all appointments, and call your doctor if you are having problems. It's also a good idea to know your test results and keep a list of the medicines you take. How can you care for yourself at home? · Try using a heating pad on a low or medium setting for 15 to 20 minutes every 2 or 3 hours. Try a warm shower in place of one session with the heating pad. · You can also try an ice pack for 10 to 15 minutes every 2 to 3 hours. Put a thin cloth between the ice and your skin. · Take pain medicines exactly as directed. ? If the doctor gave you a prescription medicine for pain, take it as prescribed. ? If you are not taking a prescription pain medicine, ask your doctor if you can take an over-the-counter medicine. · If your doctor recommends a cervical collar, wear it exactly as directed. When should you call for help? Call your doctor now or seek immediate medical care if: 
  · You have new or worsening numbness in your arms, buttocks or legs.  
  · You have new or worsening weakness in your arms or legs. (This could make it hard to stand up.)  
  · You lose control of your bladder or bowels.   
Watch closely for changes in your health, and be sure to contact your doctor if: 
  · Your neck pain is getting worse.  
  · You are not getting better after 1 week.  
  · You do not get better as expected. Where can you learn more? Go to http://www.gray.com/ Enter [id number] in the search box to learn more about \"Neck Pain: Care Instructions. \" Current as of: July 1, 2021               Content Version: 13.0 © 3063-9751 [employer]. Care instructions adapted under license by [employer] (which disclaims liability or warranty for this information). If you have questions about a medical condition or this instruction, always ask your healthcare professional. [place] [address] any warranty or liability for your use of this information. Neck: Exercises Introduction Here are some examples of exercises for you to try. The exercises may be suggested for a condition or for rehabilitation. Start each exercise slowly. Ease off the exercises if you start to have pain. You will be told when to start these exercises and which ones will work best for you. How to do the exercises Neck stretch 1. This stretch works best if you keep your shoulder down as you lean away from it. To help you remember to do this, start by relaxing your shoulders and lightly holding on to your thighs or your chair. 2. Tilt your head toward your shoulder and hold for 15 to 30 seconds. Let the weight of your head stretch your muscles. 3. If you would like a little added stretch, use your hand to gently and steadily pull your head toward your shoulder. For example, keeping your right shoulder down, lean your head to the left. 4. Repeat 2 to 4 times toward each shoulder. Diagonal neck stretch 1. Turn your head slightly toward the direction you will be stretching, and tilt your head diagonally toward your chest and hold for 15 to 30 seconds.  
2. If you would like a little added stretch, use your hand to gently and steadily pull your head forward on the diagonal. 
3. Repeat 2 to 4 times toward each side. Dorsal glide stretch The dorsal glide stretches the back of the neck. If you feel pain, do not glide so far back. Some people find this exercise easier to do while lying on their backs with an ice pack on the neck. 1. Sit or stand tall and look straight ahead. 2. Slowly tuck your chin as you glide your head backward over your body 3. Hold for a count of 6, and then relax for up to 10 seconds. 4. Repeat 8 to 12 times. Chest and shoulder stretch 1. Sit or stand tall and glide your head backward as in the dorsal glide stretch. 2. Raise both arms so that your hands are next to your ears. 3. Take a deep breath, and as you breathe out, lower your elbows down and behind your back. You will feel your shoulder blades slide down and together, and at the same time you will feel a stretch across your chest and the front of your shoulders. 4. Hold for about 6 seconds, and then relax for up to 10 seconds. 5. Repeat 8 to 12 times. Strengthening: Hands on head 1. Move your head backward, forward, and side to side against gentle pressure from your hands, holding each position for about 6 seconds. 2. Repeat 8 to 12 times. Follow-up care is a key part of your treatment and safety. Be sure to make and go to all appointments, and call your doctor if you are having problems. It's also a good idea to know your test results and keep a list of the medicines you take. Where can you learn more? Go to http://www.G5.com/ Enter P975 in the search box to learn more about \"Neck: Exercises. \" Current as of: July 1, 2021               Content Version: 13.0 © 9830-7125 [employer]. Care instructions adapted under license by [employer] (which disclaims liability or warranty for this information).  If you have questions about a medical condition or this instruction, always ask your healthcare professional. Levi Marcos any warranty or liability for your use of this information.

## 2021-11-05 NOTE — PROGRESS NOTES
Rakel Marie is a 59 y.o. female who presents today with the following:  Chief Complaint   Patient presents with    Neck Pain     Patient in room #5 with complaints of neck pain on left side starting last night, fatigued       HPI     C/o left side neck pain that started last night. Describes as sharp pain. Pain radiates into top of shoulder up into left side of neck. Pain worse with turning neck to left side. Placed [employer] and hot pack on it which helped some. Took hot shower which did not help. 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 Works as a  at [employer]. Past Medical History:   Diagnosis Date    Menopause        No Known Allergies  Current Outpatient Medications   Medication Sig    ascorbic acid, vitamin C, (Vitamin C) 500 mg tablet Take 500 mg by mouth daily.  cholecalciferol (VITAMIN D3) (2,000 UNITS /50 MCG) cap capsule Take  by mouth.  ascorbic acid, vitamin C, (Vitamin C) 500 mg tablet Vitamin C   qd (Patient not taking: Reported on 11/5/2021)    cholecalciferol (Vitamin D3) 25 mcg (1,000 unit) cap Vitamin D   qd (Patient not taking: Reported on 11/5/2021)    zinc sulfate (ZINC-15 PO) Take 1 Tab by mouth daily. (Patient not taking: Reported on 11/5/2021)    gabapentin (NEURONTIN) 100 mg capsule Take 2 or 3 3 times a day as needed for leg pain. Indications: NEUROPATHIC PAIN (Patient not taking: Reported on 11/5/2021)     No current facility-administered medications for this visit. Review of Systems   Musculoskeletal: Positive for myalgias. BP (!) 151/56   Pulse (!) 53   Temp 97.6 °F (36.4 °C)   Resp 16   Ht 5' 3\" (1.6 m)   Wt 150 lb (68 kg)   SpO2 100%   BMI 26.57 kg/m²   Physical Exam  Constitutional:       Appearance: Normal appearance. Neck:      Comments: Cervical spine nttp. Left side trapezius muscle ttp. Able to elicit pain with turning neck to left.   Neck strength 5/5 with lateral movement and extension. 4/5 with flexion. Left side neck pain with flexion  Neurological:      Mental Status: She is alert. 1. Muscle pain   - Rx ibuprofen, heat.  May use Bengay   - Rx Flexeril 5 mg TID prn   - Given neck exercises   - Given work note to avoid any heavy lifting today   - Follow up prn

## 2021-11-05 NOTE — LETTER
NOTIFICATION RETURN TO WORK / SCHOOL 
 
11/5/2021 11:50 AM 
 
Ms. Yuni Gardner [address] 
[place] [address] To Whom It May Concern: 
 
Yuni Gardner is currently under the care of [address] [place] [address]. She will return to work/school on 11/05/2021. Avoid any heavy lifting today. If you have any questions, please call the office at [phone]. Sincerely, Jessy Wolfe NP

## 2022-02-18 ENCOUNTER — OFFICE VISIT (OUTPATIENT)
Dept: PRIMARY CARE CLINIC | Age: 62
End: 2022-02-18

## 2022-02-18 VITALS
TEMPERATURE: 97.4 F | HEIGHT: 62 IN | DIASTOLIC BLOOD PRESSURE: 70 MMHG | BODY MASS INDEX: 27.57 KG/M2 | SYSTOLIC BLOOD PRESSURE: 109 MMHG | HEART RATE: 64 BPM | WEIGHT: 149.8 LBS | RESPIRATION RATE: 16 BRPM

## 2022-02-18 DIAGNOSIS — J39.2 THROAT IRRITATION: ICD-10-CM

## 2022-02-18 DIAGNOSIS — R09.89 RUNNY NOSE: ICD-10-CM

## 2022-02-18 DIAGNOSIS — R22.1 SWOLLEN UVULA: Primary | ICD-10-CM

## 2022-02-18 LAB
S PYO AG THROAT QL: NEGATIVE
SARS-COV-2 POC: NEGATIVE
VALID INTERNAL CONTROL?: YES

## 2022-02-18 PROCEDURE — 87426 SARSCOV CORONAVIRUS AG IA: CPT | Performed by: NURSE PRACTITIONER

## 2022-02-18 PROCEDURE — 99213 OFFICE O/P EST LOW 20 MIN: CPT | Performed by: NURSE PRACTITIONER

## 2022-02-18 PROCEDURE — 87880 STREP A ASSAY W/OPTIC: CPT | Performed by: NURSE PRACTITIONER

## 2022-02-18 RX ORDER — PREDNISONE 20 MG/1
40 TABLET ORAL
Qty: 6 TABLET | Refills: 0 | Status: SHIPPED | OUTPATIENT
Start: 2022-02-18 | End: 2022-02-21

## 2022-02-18 RX ORDER — CETIRIZINE HCL 10 MG
10 TABLET ORAL DAILY
Qty: 30 TABLET | Refills: 1 | Status: SHIPPED | OUTPATIENT
Start: 2022-02-18

## 2022-02-18 NOTE — PROGRESS NOTES
Subjective:   Isamar Bain presents for evaluation of Aphagia (When Pt wakes up in the morning she can't swallow. Started 2/11/22) and Cold Symptoms (Pt has a runny nose that started a couple days ago. )     This started 2 days ago, and is stable since that time. Feels like she can't swallow when awakening in morning. This improves later in the day and evening. Not able to eat or drink in mornings but able to later in day. She also reports rhinorrhea. She denies a history of: fever, fatigue, myalgias, headache, sore throat, hoarseness and dry cough. Treatments have included: none. Relevant PMH: No pertinent additional PMH. Patient reports sick contacts: no.  Works at a school. /70   Pulse 64   Temp 97.4 °F (36.3 °C)   Resp 16   Ht 5' 2\" (1.575 m)   Wt 149 lb 12.8 oz (67.9 kg)   BMI 27.40 kg/m²     Physical Exam  General: alert, cooperative, no distress, appears stated age  Eye exam: negative  Ear exam: normal TM's and external ear canals AU  Sinus exam: Normal paranasal sinuses without tenderness  Oropharynx exam: Lips, mucosa, and tongue normal. Teeth and gums normal and abnormal findings: mild to moderate uvula swelling, no erythema, exudate or ulceration. Neck exam: supple, symmetrical, trachea midline and no adenopathy  Heart exam: normal rate, regular rhythm, normal S1, S2, no murmurs, rubs, clicks or gallops  Lung exam: clear to auscultation bilaterally    Results for orders placed or performed in visit on 02/18/22   AMB POC RAPID STREP A   Result Value Ref Range    VALID INTERNAL CONTROL POC Yes     Group A Strep Ag Negative Negative   AMB POC SARS-COV-2   Result Value Ref Range    SARS-COV-2 POC Negative Negative   -[place] PCR      Assessment/Plan:   1. Swollen uvula  2. Throat irritation  -     AMB POC RAPID STREP A  -     AMB POC SARS-COV-2  3.  Runny nose  -     AMB POC SARS-COV-2    Orders Placed This Encounter    AMB POC RAPID STREP A    AMB POC SARS-COV-2 ANTIGEN    predniSONE (DELTASONE) 20 mg tablet    cetirizine (ZYRTEC) 10 mg tablet     The above diagnosis is a new problem. We discussed expected course, resolution, and complications of diagnosis in detail. Recommend warm salt water gargle, otc analgesics, throat lozenges, etc.  Follow-up if no improvement and prn. No follow-ups on file. An electronic signature was used to authenticate this note.   -- Wilfredo Biswas, NP

## 2022-02-18 NOTE — PROGRESS NOTES
Chief Complaint   Patient presents with   Collette Patience     When Pt wakes up in the morning she can't swallow. Started 2/11/22    Cold Symptoms     Pt has a runny nose that started a couple days ago.       Visit Vitals  /70   Pulse 64   Temp 97.4 °F (36.3 °C)   Resp 16   Ht 5' 2\" (1.575 m)   Wt 149 lb 12.8 oz (67.9 kg)   BMI 27.40 kg/m²

## 2022-02-18 NOTE — PATIENT INSTRUCTIONS
Rapid Strep Test: About This Test  What is it? A rapid strep test checks the bacteria in your throat to see if strep is the cause of your sore throat. Why is this test done? It may be done so your doctor can find out right away whether you have strep throat. There is another test for strep, called a throat culture, but that test takes a few days to get the results. How do you prepare for the test?  You don't need to do anything before you have this test.  How is the test done? · You will be asked to tilt your head back and open your mouth as wide as possible. · Your doctor will press your tongue down with a flat stick (tongue depressor) and then examine your mouth and throat. · A clean cotton swab will be rubbed over the back of your throat, around your tonsils, and over any red areas or sores to collect a sample. How long does the test take? · The test takes less than a minute. · Results are available in 10 to 15 minutes. Follow-up care is a key part of your treatment and safety. Be sure to make and go to all appointments, and call your doctor if you are having problems. It's also a good idea to keep a list of the medicines you take. Ask your doctor when you can expect to have your test results. Where can you learn more? Go to http://www.gray.com/  Enter B356 in the search box to learn more about \"Rapid Strep Test: About This Test.\"  Current as of: December 2, 2020               Content Version: 13.0  © 2006-2021 [employer], [facility]. Care instructions adapted under license by [employer] (which disclaims liability or warranty for this information). If you have questions about a medical condition or this instruction, always ask your healthcare professional. [place] [address] any warranty or liability for your use of this information.

## 2022-03-18 PROBLEM — G57.93 NEUROPATHIC PAIN OF BOTH LEGS: Status: ACTIVE | Noted: 2017-09-05

## 2022-04-13 ENCOUNTER — OFFICE VISIT (OUTPATIENT)
Dept: PRIMARY CARE CLINIC | Age: 62
End: 2022-04-13

## 2022-04-13 VITALS
RESPIRATION RATE: 16 BRPM | WEIGHT: 146.8 LBS | TEMPERATURE: 97.5 F | HEART RATE: 59 BPM | HEIGHT: 63 IN | DIASTOLIC BLOOD PRESSURE: 74 MMHG | SYSTOLIC BLOOD PRESSURE: 112 MMHG | OXYGEN SATURATION: 98 % | BODY MASS INDEX: 26.01 KG/M2

## 2022-04-13 DIAGNOSIS — L03.032 PARONYCHIA OF GREAT TOE OF LEFT FOOT: Primary | ICD-10-CM

## 2022-04-13 DIAGNOSIS — M79.675 GREAT TOE PAIN, LEFT: ICD-10-CM

## 2022-04-13 PROCEDURE — 99213 OFFICE O/P EST LOW 20 MIN: CPT | Performed by: NURSE PRACTITIONER

## 2022-04-13 RX ORDER — CEPHALEXIN 500 MG/1
500 CAPSULE ORAL 4 TIMES DAILY
Qty: 28 CAPSULE | Refills: 0 | Status: SHIPPED | OUTPATIENT
Start: 2022-04-13 | End: 2022-04-20

## 2022-04-13 NOTE — PROGRESS NOTES
HISTORY OF PRESENT ILLNESS  Manny Becerra is a 57 y.o. female. Patient with a 2 week history of non traumatic left great toe pain and swelling. Reports pain near nail with some redness and draining. No history of gout or previous injury. Does have a history of neuropathy. Sensation. Is off and on  Has tried Alcohol, witch hazel. Peroxide. No improvement. Past Medical History:   Diagnosis Date    Menopause      Past Surgical History:   Procedure Laterality Date    HX GYN  2004    HX HYSTERECTOMY      HX ORTHOPAEDIC         Review of Systems   Constitutional: Negative for malaise/fatigue. Respiratory: Positive for hemoptysis. Musculoskeletal: Negative for joint pain and myalgias. Skin: Negative for itching and rash. Neurological: Positive for tingling. All other systems reviewed and are negative. Physical Exam  Vitals and nursing note reviewed. Constitutional:       Appearance: Normal appearance. HENT:      Head: Normocephalic and atraumatic. Cardiovascular:      Rate and Rhythm: Normal rate. Pulses: Normal pulses. Pulmonary:      Effort: Pulmonary effort is normal.   Musculoskeletal:      Cervical back: Normal range of motion. Feet:    Feet:      Comments: Erythema, yellow purulent drainage base of nail left great toe. No joint pain. Right great toe yellow discolored nail  Skin:     Capillary Refill: Capillary refill takes less than 2 seconds. Neurological:      Mental Status: She is alert. Psychiatric:         Mood and Affect: Mood normal.         ASSESSMENT and PLAN    ICD-10-CM ICD-9-CM    1. Paronychia of great toe of left foot  L03.032 681.11    2. Great toe pain, left  M79.675 729.5      Encounter Diagnoses   Name Primary?     Paronychia of great toe of left foot Yes    Great toe pain, left      Orders Placed This Encounter    cephALEXin (KEFLEX) 500 mg capsule   Epson salts soak 3-4 times a day  Apply bacitracin and cover  Monitor right great toe nail. Medications as directed  Take with food. Complete entire course of prescription. Follow plan of care as discussed.   Signed By: TANO Anaya     April 13, 2022

## 2022-04-13 NOTE — PROGRESS NOTES
Chief Complaint   Patient presents with    Toe Pain     Something wrong with my big toe. Swollen and sore X 2 weeks. Not taking any meds for it.  She hasn't seen another provider     Visit Vitals  /74   Pulse (!) 59   Temp 97.5 °F (36.4 °C)   Resp 16   Ht 5' 3\" (1.6 m)   Wt 146 lb 12.8 oz (66.6 kg)   SpO2 98%   BMI 26.00 kg/m²

## 2022-04-13 NOTE — PATIENT INSTRUCTIONS
Paronychia: Care Instructions  Overview  Paronychia (say \"qfra-op-TL-eliud-uh\") is an inflammation of the skin around a fingernail or toenail. It happens when germs enter through a break in the skin. If you had an abscess, your doctor may have made a small cut in the infected area to drain the pus. Most cases of paronychia improve in a few days. But watch your symptoms and follow your doctor's advice. Though rare, a mild case can turn into something more serious and infect your entire finger or toe. Also, it is possible for an infection to return. Follow-up care is a key part of your treatment and safety. Be sure to make and go to all appointments, and call your doctor if you are having problems. It's also a good idea to know your test results and keep a list of the medicines you take. How can you care for yourself at home? · If your doctor told you how to care for your infected nail, follow the doctor's instructions. If you did not get instructions, follow this general advice:  ? Wash the area with clean water 2 times a day. Don't use hydrogen peroxide or alcohol, which can slow healing. ? You may cover the area with a thin layer of petroleum jelly, such as Vaseline, and a nonstick bandage. ? Apply more petroleum jelly and replace the bandage as needed. · If your doctor prescribed antibiotics, take them as directed. Do not stop taking them just because you feel better. You need to take the full course of antibiotics. · Take an over-the-counter pain medicine, such as acetaminophen (Tylenol), ibuprofen (Advil, Motrin), or naproxen (Aleve). Read and follow all instructions on the label. · Do not take two or more pain medicines at the same time unless the doctor told you to. Many pain medicines have acetaminophen, which is Tylenol. Too much acetaminophen (Tylenol) can be harmful. · Prop up the toe or finger so that it is higher than the level of your heart. This will help with pain and swelling.   · Apply heat. Put a warm water bottle, heating pad set on low, or warm cloth on your finger or toe. Do not go to sleep with a heating pad on your skin. · Soak the area in warm water twice a day for 15 minutes each time. After soaking, dry the area well and apply a thin layer of petroleum jelly, such as Vaseline. Put on a new bandage. When should you call for help? Call your doctor now or seek immediate medical care if:    · You have signs of new or worsening infection, such as:  ? Increased pain, swelling, warmth, or redness. ? Red streaks leading from the infected skin. ? Pus draining from the area. ? A fever. Watch closely for changes in your health, and be sure to contact your doctor if:    · You do not get better as expected. Where can you learn more? Go to http://www.jesus.com/  Enter C435 in the search box to learn more about \"Paronychia: Care Instructions. \"  Current as of: November 15, 2021               Content Version: 13.2  © 2006-2022 [employer]. Care instructions adapted under license by [employer] (which disclaims liability or warranty for this information). If you have questions about a medical condition or this instruction, always ask your healthcare professional. [address] any warranty or liability for your use of this information.

## 2022-06-17 ENCOUNTER — TRANSCRIBE ORDER (OUTPATIENT)
Dept: SCHEDULING | Age: 62
End: 2022-06-17

## 2022-06-17 DIAGNOSIS — Z12.31 VISIT FOR SCREENING MAMMOGRAM: Primary | ICD-10-CM

## 2022-07-08 ENCOUNTER — HOSPITAL ENCOUNTER (OUTPATIENT)
Dept: MAMMOGRAPHY | Age: 62
Discharge: HOME | End: 2022-07-08
Attending: FAMILY MEDICINE
Payer: COMMERCIAL

## 2022-07-08 DIAGNOSIS — Z12.31 VISIT FOR SCREENING MAMMOGRAM: ICD-10-CM

## 2022-07-08 PROCEDURE — 77063 BREAST TOMOSYNTHESIS BI: CPT

## 2022-10-13 ENCOUNTER — HOSPITAL ENCOUNTER (OUTPATIENT)
Dept: GENERAL RADIOLOGY | Age: 62
Discharge: HOME | End: 2022-10-13
Attending: INTERNAL MEDICINE
Payer: COMMERCIAL

## 2022-10-13 DIAGNOSIS — D47.2 MONOCLONAL GAMMOPATHY: ICD-10-CM

## 2022-10-13 PROCEDURE — 77075 RADEX OSSEOUS SURVEY COMPL: CPT

## 2022-11-30 ENCOUNTER — OFFICE VISIT (OUTPATIENT)
Dept: PRIMARY CARE CLINIC | Age: 62
End: 2022-11-30

## 2022-11-30 VITALS
DIASTOLIC BLOOD PRESSURE: 80 MMHG | OXYGEN SATURATION: 98 % | TEMPERATURE: 97 F | HEART RATE: 56 BPM | RESPIRATION RATE: 16 BRPM | HEIGHT: 63 IN | BODY MASS INDEX: 26.82 KG/M2 | WEIGHT: 151.4 LBS | SYSTOLIC BLOOD PRESSURE: 120 MMHG

## 2022-11-30 DIAGNOSIS — U07.1 COVID-19: Primary | ICD-10-CM

## 2022-11-30 DIAGNOSIS — B34.9 VIRAL ILLNESS: ICD-10-CM

## 2022-11-30 DIAGNOSIS — J11.1 INFLUENZA-LIKE ILLNESS: ICD-10-CM

## 2022-11-30 LAB
FLUAV+FLUBV AG NOSE QL IA.RAPID: NEGATIVE
FLUAV+FLUBV AG NOSE QL IA.RAPID: NEGATIVE
SARS-COV-2 PCR, POC: POSITIVE
VALID INTERNAL CONTROL?: YES

## 2022-11-30 NOTE — LETTER
NOTIFICATION RETURN TO WORK / SCHOOL    11/30/2022 11:48 AM    Ms. Oni Moran [id number] [address] [place] [address]      To Whom It May Concern:    Tristin Harris is currently under the care of Lori Ocampo. She will return to work/school on: 12/5/22    If there are questions or concerns please have the patient contact our office.         Sincerely,      TANO Haile

## 2022-11-30 NOTE — PROGRESS NOTES
Identified pt with two pt identifiers(name and ). Reviewed record in preparation for visit and have obtained necessary documentation. Chief Complaint   Patient presents with    Cold Symptoms     Started ; congestion/cough/body aches/chills/ears feel clogged; no covid testing      Vitals:    22 1114   BP: 120/80   Pulse: (!) 56   Resp: 16   Temp: 97 °F (36.1 °C)   TempSrc: Temporal   SpO2: 98%   Weight: 151 lb 6.4 oz (68.7 kg)   Height: 5' 3\" (1.6 m)   PainSc:   9   PainLoc: Generalized       Health Maintenance Review: Patient reminded of \"due or due soon\" health maintenance. I have asked the patient to contact his/her primary care provider (PCP) for follow-up on his/her health maintenance. Coordination of Care Questionnaire:  :   1) Have you been to an emergency room, urgent care, or hospitalized since your last visit? If yes, where when, and reason for visit? no       2. Have seen or consulted any other health care provider since your last visit? If yes, where when, and reason for visit? NO      Patient is accompanied by self I have received verbal consent from Elias Olson to discuss any/all medical information while they are present in the room.

## 2022-11-30 NOTE — PROGRESS NOTES
Chief Complaint   Patient presents with    Cold Symptoms     Started Sunday; congestion/cough/body aches/chills/ears feel clogged; no covid testing   HISTORY OF PRESENT ILLNESS  Giacomo Feng is a 57 y.o. female. Patient presents with congestion/cough/body aches/chills/ears feel clogged. Onset four days. She is not taking any PTC medications. No covid testing. She denies shortness of breathing and tolerating PO. Review of Systems   Constitutional:  Positive for chills and malaise/fatigue. HENT:  Positive for congestion. Respiratory:  Positive for cough. Cardiovascular: Negative. Gastrointestinal: Negative. Musculoskeletal:  Positive for myalgias. Past Medical History:   Diagnosis Date    Menopause      Past Surgical History:   Procedure Laterality Date    HX GYN  2004    HX HYSTERECTOMY      HX ORTHOPAEDIC     Physical Exam  Vitals and nursing note reviewed. HENT:      Right Ear: Tympanic membrane normal.      Left Ear: Tympanic membrane normal.      Nose: Congestion and rhinorrhea present. Mouth/Throat:      Pharynx: Oropharynx is clear. No oropharyngeal exudate or posterior oropharyngeal erythema. Cardiovascular:      Rate and Rhythm: Normal rate and regular rhythm. Pulmonary:      Effort: Pulmonary effort is normal. No respiratory distress. Breath sounds: Normal breath sounds. Musculoskeletal:      Cervical back: Neck supple. Neurological:      Mental Status: She is alert.      Visit Vitals  /80 (BP 1 Location: Left arm, BP Patient Position: Sitting)   Pulse (!) 56   Temp 97 °F (36.1 °C) (Temporal)   Resp 16   Ht 5' 3\" (1.6 m)   Wt 151 lb 6.4 oz (68.7 kg)   SpO2 98%   BMI 26.82 kg/m²     Results for orders placed or performed in visit on 11/30/22   AMB POC BLADE INFLUENZA A/B TEST   Result Value Ref Range    VALID INTERNAL CONTROL POC Yes     Influenza A Ag POC Negative Negative    Influenza B Ag POC Negative Negative   POCT COVID-19, SARS-COV-2, PCR Result Value Ref Range    SARS-COV-2 PCR, POC Positive (A) Negative   ASSESSMENT and PLAN    ICD-10-CM ICD-9-CM    1. COVID-19  U07.1 079.89       2. Influenza-like illness  J11.1 487.1       3. Viral illness  B34.9 079.99 AMB POC BLADE INFLUENZA A/B TEST      POCT COVID-19, SARS-COV-2, PCR        Encounter Diagnoses   Name Primary?  COVID-19 Yes    Influenza-like illness     Viral illness      Orders Placed This Encounter    AMB POC BLADE INFLUENZA A/B TEST    POCT COVID-19, SARS-COV-2, PCR    nirmatrelvir-ritonavir (PAXLOVID) 300 mg (150 mg x 2)-100 mg   Discussed CDC recommendations for isolation and mask wearing. Recommended rest, push fluids, and may take OTC Mucinex or decongestants for symptom relief. Encouraged to take tylenol or ibuprofen for fever or discomfort as needed. Instructed to go to the nearest ED for difficulty breathing, shortness of breath, or can not tolerate PO.        Signed By: TANO Castellanos     November 30, 2022

## 2023-02-23 ENCOUNTER — OFFICE VISIT (OUTPATIENT)
Dept: PRIMARY CARE CLINIC | Age: 63
End: 2023-02-23

## 2023-02-23 VITALS
HEART RATE: 53 BPM | HEIGHT: 61 IN | TEMPERATURE: 97.5 F | WEIGHT: 145.8 LBS | RESPIRATION RATE: 16 BRPM | OXYGEN SATURATION: 98 % | SYSTOLIC BLOOD PRESSURE: 118 MMHG | BODY MASS INDEX: 27.53 KG/M2 | DIASTOLIC BLOOD PRESSURE: 77 MMHG

## 2023-02-23 DIAGNOSIS — B00.1 COLD SORE: ICD-10-CM

## 2023-02-23 DIAGNOSIS — J10.1 INFLUENZA B: Primary | ICD-10-CM

## 2023-02-23 LAB
FLUAV+FLUBV AG NOSE QL IA.RAPID: NEGATIVE
FLUAV+FLUBV AG NOSE QL IA.RAPID: POSITIVE
VALID INTERNAL CONTROL?: YES

## 2023-02-23 PROCEDURE — 87804 INFLUENZA ASSAY W/OPTIC: CPT | Performed by: NURSE PRACTITIONER

## 2023-02-23 PROCEDURE — 99213 OFFICE O/P EST LOW 20 MIN: CPT | Performed by: NURSE PRACTITIONER

## 2023-02-23 RX ORDER — VALACYCLOVIR HYDROCHLORIDE 1 G/1
2000 TABLET, FILM COATED ORAL 2 TIMES DAILY
Qty: 4 TABLET | Refills: 0 | Status: SHIPPED | OUTPATIENT
Start: 2023-02-23 | End: 2023-02-24

## 2023-02-23 NOTE — PROGRESS NOTES
Chief Complaint   Patient presents with    Generalized Body Aches     Body aches, H/A, Chills started Monday. Felt better and went back to work yesterday.   Woke up with \"fever blisters\" then chills and body aches started back last night with ST.  Negative Covid test yesterday     Visit Vitals  /77   Pulse (!) 53   Temp 97.5 °F (36.4 °C) (Temporal)   Resp 16   Ht 5' 1\" (1.549 m)   Wt 145 lb 12.8 oz (66.1 kg)   SpO2 98%   BMI 27.55 kg/m²

## 2023-02-23 NOTE — PROGRESS NOTES
Chief Complaint   Patient presents with    Generalized Body Aches     Body aches, H/A, Chills started Monday. Felt better and went back to work yesterday. Woke up with \"fever blisters\" then chills and body aches started back last night with ST.  Negative Covid test yesterday     HISTORY OF PRESENT ILLNESS  Alecia Matos is a 57 y.o. female. She is here with four days of fever, body aches, and chills. She notes woke up yesterday with fever blister. 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 Tolerating PO without difficulty. She denies recent fever, cough, shortness of breath, or chest pain. Of note Nov '22 had active covid infection. Review of Systems   Constitutional:  Positive for chills. HENT: Negative. Respiratory: Negative. Cardiovascular: Negative. Gastrointestinal: Negative. Genitourinary: Negative. Musculoskeletal:  Positive for myalgias. Physical Exam  Vitals and nursing note reviewed. HENT:      Right Ear: Tympanic membrane normal.      Left Ear: Tympanic membrane normal.      Mouth/Throat:      Mouth: Oral lesions present. Pharynx: Oropharynx is clear. No oropharyngeal exudate or posterior oropharyngeal erythema. Comments: Vesicular lesions to lower right bottom lip, no surrounding erythema or exudates  Cardiovascular:      Rate and Rhythm: Normal rate. Pulmonary:      Effort: Pulmonary effort is normal. No respiratory distress. Breath sounds: Normal breath sounds. Musculoskeletal:      Cervical back: Neck supple. Neurological:      Mental Status: She is alert.      Past Medical History:   Diagnosis Date    Menopause      Past Surgical History:   Procedure Laterality Date    HX GYN  2004    HX HYSTERECTOMY      HX ORTHOPAEDIC       /77   Pulse (!) 53   Temp 97.5 °F (36.4 °C) (Temporal)   Resp 16   Ht 5' 1\" (1.549 m)   Wt 145 lb 12.8 oz (66.1 kg)   SpO2 98%   BMI 27.55 kg/m²      Results for orders placed or performed in visit on 02/23/23   AMB POC BLADE INFLUENZA A/B TEST   Result Value Ref Range    VALID INTERNAL CONTROL POC Yes     Influenza A Ag POC Negative Negative    Influenza B Ag POC Positive (A) Negative   ASSESSMENT and PLAN  1. Influenza B  -     AMB POC BLADE INFLUENZA A/B TEST  2. Cold sore  -     valACYclovir (VALTREX) 1 gram tablet;  Take 2 Tablets by mouth two (2) times a day for 1 day., Normal, Disp-4 Tablet, R-0  - Take medication as prescribed, Add OTC tylenol and/or ibuprofen for fever/discomfort prn  - Rest, push fluids  - Follow up prn; Discussed ED evaluation for shortness of breath, chest pain, or difficulty breathing  Saud Morales, FNP

## 2023-02-23 NOTE — LETTER
NOTIFICATION RETURN TO WORK / SCHOOL    2/23/2023 10:47 AM    Ms. Oni Moran [address] [place] [address],[facility] Floor [id number]      To Whom It May Concern:    Rosalba Sorto is currently under the care of [address] [place] [address]. Results for orders placed or performed in visit on 02/23/23   AMB POC BLADE INFLUENZA A/B TEST   Result Value Ref Range    VALID INTERNAL CONTROL POC Yes     Influenza A Ag POC Negative Negative    Influenza B Ag POC Positive (A) Negative           She will return to work/school on: 2/27/23 as long as fever free for 24 hours. If there are questions or concerns please have the patient contact our office.         Sincerely,      TANO Cason

## 2023-07-11 ENCOUNTER — TRANSCRIBE ORDERS (OUTPATIENT)
Facility: HOSPITAL | Age: 63
End: 2023-07-11

## 2023-07-11 DIAGNOSIS — Z12.31 ENCOUNTER FOR MAMMOGRAM TO ESTABLISH BASELINE MAMMOGRAM: Primary | ICD-10-CM

## 2023-08-04 ENCOUNTER — OFFICE VISIT (OUTPATIENT)
Age: 63
End: 2023-08-04

## 2023-08-04 ENCOUNTER — HOSPITAL ENCOUNTER (OUTPATIENT)
Facility: HOSPITAL | Age: 63
End: 2023-08-04
Attending: FAMILY MEDICINE
Payer: MEDICARE

## 2023-08-04 VITALS
SYSTOLIC BLOOD PRESSURE: 145 MMHG | WEIGHT: 145 LBS | HEART RATE: 51 BPM | TEMPERATURE: 97.8 F | BODY MASS INDEX: 27.4 KG/M2 | DIASTOLIC BLOOD PRESSURE: 78 MMHG | OXYGEN SATURATION: 98 %

## 2023-08-04 DIAGNOSIS — M25.511 RIGHT SHOULDER PAIN, UNSPECIFIED CHRONICITY: Primary | ICD-10-CM

## 2023-08-04 DIAGNOSIS — Z12.31 ENCOUNTER FOR MAMMOGRAM TO ESTABLISH BASELINE MAMMOGRAM: ICD-10-CM

## 2023-08-04 DIAGNOSIS — M75.81 ROTATOR CUFF TENDINITIS, RIGHT: ICD-10-CM

## 2023-08-04 DIAGNOSIS — I83.91 ASYMPTOMATIC VARICOSE VEINS OF RIGHT LOWER EXTREMITY: ICD-10-CM

## 2023-08-04 PROCEDURE — 77063 BREAST TOMOSYNTHESIS BI: CPT

## 2023-08-04 RX ORDER — NAPROXEN 250 MG/1
250-500 TABLET ORAL 2 TIMES DAILY WITH MEALS
Qty: 40 TABLET | Refills: 0 | Status: SHIPPED | OUTPATIENT
Start: 2023-08-04 | End: 2023-08-14

## 2023-12-15 ENCOUNTER — OFFICE VISIT (OUTPATIENT)
Age: 63
End: 2023-12-15

## 2023-12-15 VITALS
WEIGHT: 149.4 LBS | DIASTOLIC BLOOD PRESSURE: 75 MMHG | SYSTOLIC BLOOD PRESSURE: 115 MMHG | RESPIRATION RATE: 16 BRPM | BODY MASS INDEX: 28.23 KG/M2 | TEMPERATURE: 98.3 F | OXYGEN SATURATION: 99 % | HEART RATE: 59 BPM

## 2023-12-15 DIAGNOSIS — B35.1 ONYCHOMYCOSIS: Primary | ICD-10-CM

## 2024-01-31 ENCOUNTER — APPOINTMENT (OUTPATIENT)
Facility: HOSPITAL | Age: 64
End: 2024-01-31
Payer: COMMERCIAL

## 2024-01-31 ENCOUNTER — HOSPITAL ENCOUNTER (OUTPATIENT)
Facility: HOSPITAL | Age: 64
Setting detail: OBSERVATION
Discharge: HOME | End: 2024-02-01
Attending: STUDENT IN AN ORGANIZED HEALTH CARE EDUCATION/TRAINING PROGRAM | Admitting: INTERNAL MEDICINE
Payer: COMMERCIAL

## 2024-01-31 ENCOUNTER — APPOINTMENT (OUTPATIENT)
Facility: HOSPITAL | Age: 64
End: 2024-01-31
Attending: INTERNAL MEDICINE
Payer: COMMERCIAL

## 2024-01-31 DIAGNOSIS — R42 VERTIGO: ICD-10-CM

## 2024-01-31 DIAGNOSIS — R29.898 WEAKNESS OF LEFT ARM: ICD-10-CM

## 2024-01-31 DIAGNOSIS — R42 DIZZINESS: Primary | ICD-10-CM

## 2024-01-31 LAB
ALBUMIN SERPL-MCNC: 3.8 G/DL (ref 3.5–5)
ALBUMIN/GLOB SERPL: 0.8 (ref 1.1–2.2)
ALP SERPL-CCNC: 105 U/L (ref 45–117)
ALT SERPL-CCNC: 24 U/L (ref 12–78)
ANION GAP SERPL CALC-SCNC: 4 MMOL/L (ref 5–15)
APPEARANCE UR: CLEAR
AST SERPL-CCNC: 23 U/L (ref 15–37)
BACTERIA URNS QL MICRO: NEGATIVE /HPF
BASOPHILS # BLD: 0.1 K/UL (ref 0–0.1)
BASOPHILS NFR BLD: 1 % (ref 0–1)
BILIRUB SERPL-MCNC: 0.5 MG/DL (ref 0.2–1)
BILIRUB UR QL: NEGATIVE
BUN SERPL-MCNC: 10 MG/DL (ref 6–20)
BUN/CREAT SERPL: 12 (ref 12–20)
CALCIUM SERPL-MCNC: 9.3 MG/DL (ref 8.5–10.1)
CHLORIDE SERPL-SCNC: 108 MMOL/L (ref 97–108)
CO2 SERPL-SCNC: 29 MMOL/L (ref 21–32)
COLOR UR: ABNORMAL
COMMENT:: NORMAL
CREAT SERPL-MCNC: 0.84 MG/DL (ref 0.55–1.02)
DIFFERENTIAL METHOD BLD: NORMAL
ECHO AO ROOT DIAM: 2.7 CM
ECHO AO ROOT INDEX: 1.61 CM/M2
ECHO AV AREA PEAK VELOCITY: 2 CM2
ECHO AV AREA PEAK VELOCITY: 2 CM2
ECHO AV CUSP MM: 1.7 CM
ECHO AV MEAN GRADIENT: 4 MMHG
ECHO AV MEAN VELOCITY: 1 M/S
ECHO AV PEAK GRADIENT: 8 MMHG
ECHO AV PEAK GRADIENT: 8 MMHG
ECHO AV PEAK VELOCITY: 1.4 M/S
ECHO AV PEAK VELOCITY: 1.4 M/S
ECHO AV VTI: 37.7 CM
ECHO BSA: 1.71 M2
ECHO LA DIAMETER INDEX: 1.73 CM/M2
ECHO LA DIAMETER: 2.9 CM
ECHO LA TO AORTIC ROOT RATIO: 1.07
ECHO LA VOL A-L A2C: 48 ML (ref 22–52)
ECHO LA VOL A-L A4C: 41 ML (ref 22–52)
ECHO LA VOL MOD A2C: 45 ML (ref 22–52)
ECHO LA VOL MOD A4C: 36 ML (ref 22–52)
ECHO LA VOLUME AREA LENGTH: 45 ML
ECHO LA VOLUME INDEX A-L A2C: 29 ML/M2 (ref 16–34)
ECHO LA VOLUME INDEX A-L A4C: 24 ML/M2 (ref 16–34)
ECHO LA VOLUME INDEX AREA LENGTH: 27 ML/M2 (ref 16–34)
ECHO LA VOLUME INDEX MOD A2C: 27 ML/M2 (ref 16–34)
ECHO LA VOLUME INDEX MOD A4C: 21 ML/M2 (ref 16–34)
ECHO LV E' LATERAL VELOCITY: 10 CM/S
ECHO LV E' SEPTAL VELOCITY: 10 CM/S
ECHO LV FRACTIONAL SHORTENING: 41 % (ref 28–44)
ECHO LV INTERNAL DIMENSION DIASTOLE INDEX: 2.44 CM/M2
ECHO LV INTERNAL DIMENSION DIASTOLIC: 4.1 CM (ref 3.9–5.3)
ECHO LV INTERNAL DIMENSION SYSTOLIC INDEX: 1.43 CM/M2
ECHO LV INTERNAL DIMENSION SYSTOLIC: 2.4 CM
ECHO LV IVSD: 0.9 CM (ref 0.6–0.9)
ECHO LV MASS 2D: 114.1 G (ref 67–162)
ECHO LV MASS INDEX 2D: 67.9 G/M2 (ref 43–95)
ECHO LV POSTERIOR WALL DIASTOLIC: 0.9 CM (ref 0.6–0.9)
ECHO LV RELATIVE WALL THICKNESS RATIO: 0.44
ECHO LVOT AREA: 3.1 CM2
ECHO LVOT DIAM: 2 CM
ECHO LVOT PEAK GRADIENT: 4 MMHG
ECHO LVOT PEAK VELOCITY: 1 M/S
ECHO MV A VELOCITY: 0.66 M/S
ECHO MV E DECELERATION TIME (DT): 164.3 MS
ECHO MV E VELOCITY: 0.86 M/S
ECHO MV E/A RATIO: 1.3
ECHO MV E/E' LATERAL: 8.6
ECHO MV E/E' RATIO (AVERAGED): 8.6
ECHO MV MAX VELOCITY: 0.9 M/S
ECHO MV MEAN GRADIENT: 1 MMHG
ECHO MV MEAN VELOCITY: 0.6 M/S
ECHO MV PEAK GRADIENT: 3 MMHG
ECHO MV VTI: 21.2 CM
ECHO PV MAX VELOCITY: 0.9 M/S
ECHO PV PEAK GRADIENT: 3 MMHG
ECHO TV REGURGITANT MAX VELOCITY: 1.71 M/S
ECHO TV REGURGITANT PEAK GRADIENT: 15 MMHG
EOSINOPHIL # BLD: 0.1 K/UL (ref 0–0.4)
EOSINOPHIL NFR BLD: 1 % (ref 0–7)
EPITH CASTS URNS QL MICRO: ABNORMAL /LPF
ERYTHROCYTE [DISTWIDTH] IN BLOOD BY AUTOMATED COUNT: 12.8 % (ref 11.5–14.5)
GLOBULIN SER CALC-MCNC: 4.7 G/DL (ref 2–4)
GLUCOSE BLD STRIP.AUTO-MCNC: 69 MG/DL (ref 65–117)
GLUCOSE SERPL-MCNC: 90 MG/DL (ref 65–100)
GLUCOSE UR STRIP.AUTO-MCNC: NEGATIVE MG/DL
HCT VFR BLD AUTO: 41.3 % (ref 35–47)
HGB BLD-MCNC: 13.2 G/DL (ref 11.5–16)
HGB UR QL STRIP: ABNORMAL
IMM GRANULOCYTES # BLD AUTO: 0 K/UL (ref 0–0.04)
IMM GRANULOCYTES NFR BLD AUTO: 0 % (ref 0–0.5)
INR PPP: 1 (ref 0.9–1.1)
KETONES UR QL STRIP.AUTO: NEGATIVE MG/DL
LEUKOCYTE ESTERASE UR QL STRIP.AUTO: NEGATIVE
LYMPHOCYTES # BLD: 2.1 K/UL (ref 0.8–3.5)
LYMPHOCYTES NFR BLD: 31 % (ref 12–49)
MCH RBC QN AUTO: 28.6 PG (ref 26–34)
MCHC RBC AUTO-ENTMCNC: 32 G/DL (ref 30–36.5)
MCV RBC AUTO: 89.6 FL (ref 80–99)
MONOCYTES # BLD: 0.5 K/UL (ref 0–1)
MONOCYTES NFR BLD: 7 % (ref 5–13)
NEUTS SEG # BLD: 4.1 K/UL (ref 1.8–8)
NEUTS SEG NFR BLD: 60 % (ref 32–75)
NITRITE UR QL STRIP.AUTO: NEGATIVE
NRBC # BLD: 0 K/UL (ref 0–0.01)
NRBC BLD-RTO: 0 PER 100 WBC
PH UR STRIP: 8 (ref 5–8)
PLATELET # BLD AUTO: 299 K/UL (ref 150–400)
PMV BLD AUTO: 9.6 FL (ref 8.9–12.9)
POTASSIUM SERPL-SCNC: 3.9 MMOL/L (ref 3.5–5.1)
PROT SERPL-MCNC: 8.5 G/DL (ref 6.4–8.2)
PROT UR STRIP-MCNC: NEGATIVE MG/DL
PROTHROMBIN TIME: 10.6 SEC (ref 9–11.1)
RBC # BLD AUTO: 4.61 M/UL (ref 3.8–5.2)
RBC #/AREA URNS HPF: ABNORMAL /HPF (ref 0–5)
SERVICE CMNT-IMP: NORMAL
SODIUM SERPL-SCNC: 141 MMOL/L (ref 136–145)
SP GR UR REFRACTOMETRY: 1.02 (ref 1–1.03)
SPECIMEN HOLD: NORMAL
TROPONIN I SERPL HS-MCNC: 6 NG/L (ref 0–51)
UROBILINOGEN UR QL STRIP.AUTO: 1 EU/DL (ref 0.2–1)
WBC # BLD AUTO: 6.9 K/UL (ref 3.6–11)
WBC URNS QL MICRO: ABNORMAL /HPF (ref 0–4)

## 2024-01-31 PROCEDURE — 80053 COMPREHEN METABOLIC PANEL: CPT

## 2024-01-31 PROCEDURE — 70450 CT HEAD/BRAIN W/O DYE: CPT

## 2024-01-31 PROCEDURE — 2580000003 HC RX 258: Performed by: INTERNAL MEDICINE

## 2024-01-31 PROCEDURE — 6370000000 HC RX 637 (ALT 250 FOR IP): Performed by: STUDENT IN AN ORGANIZED HEALTH CARE EDUCATION/TRAINING PROGRAM

## 2024-01-31 PROCEDURE — 6360000004 HC RX CONTRAST MEDICATION

## 2024-01-31 PROCEDURE — 99285 EMERGENCY DEPT VISIT HI MDM: CPT

## 2024-01-31 PROCEDURE — G0378 HOSPITAL OBSERVATION PER HR: HCPCS

## 2024-01-31 PROCEDURE — 6360000002 HC RX W HCPCS: Performed by: INTERNAL MEDICINE

## 2024-01-31 PROCEDURE — 0042T CT BRAIN PERFUSION: CPT

## 2024-01-31 PROCEDURE — 85025 COMPLETE CBC W/AUTO DIFF WBC: CPT

## 2024-01-31 PROCEDURE — 70498 CT ANGIOGRAPHY NECK: CPT

## 2024-01-31 PROCEDURE — 85610 PROTHROMBIN TIME: CPT

## 2024-01-31 PROCEDURE — 70551 MRI BRAIN STEM W/O DYE: CPT

## 2024-01-31 PROCEDURE — 92610 EVALUATE SWALLOWING FUNCTION: CPT

## 2024-01-31 PROCEDURE — 82962 GLUCOSE BLOOD TEST: CPT

## 2024-01-31 PROCEDURE — 96374 THER/PROPH/DIAG INJ IV PUSH: CPT

## 2024-01-31 PROCEDURE — 93306 TTE W/DOPPLER COMPLETE: CPT

## 2024-01-31 PROCEDURE — 6360000002 HC RX W HCPCS: Performed by: STUDENT IN AN ORGANIZED HEALTH CARE EDUCATION/TRAINING PROGRAM

## 2024-01-31 PROCEDURE — 81001 URINALYSIS AUTO W/SCOPE: CPT

## 2024-01-31 PROCEDURE — 84484 ASSAY OF TROPONIN QUANT: CPT

## 2024-01-31 PROCEDURE — 96372 THER/PROPH/DIAG INJ SC/IM: CPT

## 2024-01-31 PROCEDURE — 2580000003 HC RX 258: Performed by: STUDENT IN AN ORGANIZED HEALTH CARE EDUCATION/TRAINING PROGRAM

## 2024-01-31 PROCEDURE — 36415 COLL VENOUS BLD VENIPUNCTURE: CPT

## 2024-01-31 RX ORDER — ACETAMINOPHEN 325 MG/1
650 TABLET ORAL EVERY 6 HOURS PRN
Status: DISCONTINUED | OUTPATIENT
Start: 2024-01-31 | End: 2024-01-31

## 2024-01-31 RX ORDER — ONDANSETRON 2 MG/ML
4 INJECTION INTRAMUSCULAR; INTRAVENOUS EVERY 6 HOURS PRN
Status: DISCONTINUED | OUTPATIENT
Start: 2024-01-31 | End: 2024-02-01 | Stop reason: HOSPADM

## 2024-01-31 RX ORDER — ASPIRIN 81 MG/1
81 TABLET, CHEWABLE ORAL DAILY
Status: DISCONTINUED | OUTPATIENT
Start: 2024-02-01 | End: 2024-02-01 | Stop reason: HOSPADM

## 2024-01-31 RX ORDER — ONDANSETRON 4 MG/1
4 TABLET, ORALLY DISINTEGRATING ORAL EVERY 8 HOURS PRN
Status: DISCONTINUED | OUTPATIENT
Start: 2024-01-31 | End: 2024-02-01 | Stop reason: HOSPADM

## 2024-01-31 RX ORDER — ASPIRIN 300 MG/1
300 SUPPOSITORY RECTAL DAILY
Status: DISCONTINUED | OUTPATIENT
Start: 2024-02-01 | End: 2024-02-01 | Stop reason: HOSPADM

## 2024-01-31 RX ORDER — ENOXAPARIN SODIUM 100 MG/ML
40 INJECTION SUBCUTANEOUS DAILY
Status: DISCONTINUED | OUTPATIENT
Start: 2024-01-31 | End: 2024-02-01 | Stop reason: HOSPADM

## 2024-01-31 RX ORDER — ASPIRIN 325 MG
325 TABLET ORAL
Status: COMPLETED | OUTPATIENT
Start: 2024-01-31 | End: 2024-01-31

## 2024-01-31 RX ORDER — SODIUM CHLORIDE 0.9 % (FLUSH) 0.9 %
5-40 SYRINGE (ML) INJECTION PRN
Status: DISCONTINUED | OUTPATIENT
Start: 2024-01-31 | End: 2024-02-01 | Stop reason: HOSPADM

## 2024-01-31 RX ORDER — SODIUM CHLORIDE 9 MG/ML
INJECTION, SOLUTION INTRAVENOUS CONTINUOUS
Status: DISCONTINUED | OUTPATIENT
Start: 2024-01-31 | End: 2024-02-01 | Stop reason: HOSPADM

## 2024-01-31 RX ORDER — SODIUM CHLORIDE 0.9 % (FLUSH) 0.9 %
5-40 SYRINGE (ML) INJECTION EVERY 12 HOURS SCHEDULED
Status: DISCONTINUED | OUTPATIENT
Start: 2024-01-31 | End: 2024-02-01 | Stop reason: HOSPADM

## 2024-01-31 RX ORDER — POLYETHYLENE GLYCOL 3350 17 G/17G
17 POWDER, FOR SOLUTION ORAL DAILY PRN
Status: DISCONTINUED | OUTPATIENT
Start: 2024-01-31 | End: 2024-02-01 | Stop reason: HOSPADM

## 2024-01-31 RX ORDER — DIAZEPAM 5 MG/ML
2.5 INJECTION, SOLUTION INTRAMUSCULAR; INTRAVENOUS ONCE
Status: COMPLETED | OUTPATIENT
Start: 2024-01-31 | End: 2024-01-31

## 2024-01-31 RX ORDER — SODIUM CHLORIDE 9 MG/ML
INJECTION, SOLUTION INTRAVENOUS PRN
Status: DISCONTINUED | OUTPATIENT
Start: 2024-01-31 | End: 2024-02-01 | Stop reason: HOSPADM

## 2024-01-31 RX ORDER — ONDANSETRON 2 MG/ML
4 INJECTION INTRAMUSCULAR; INTRAVENOUS EVERY 4 HOURS PRN
Status: DISCONTINUED | OUTPATIENT
Start: 2024-01-31 | End: 2024-01-31

## 2024-01-31 RX ADMIN — SODIUM CHLORIDE, PRESERVATIVE FREE 10 ML: 5 INJECTION INTRAVENOUS at 21:11

## 2024-01-31 RX ADMIN — IOPAMIDOL 100 ML: 755 INJECTION, SOLUTION INTRAVENOUS at 11:48

## 2024-01-31 RX ADMIN — ASPIRIN 325 MG: 325 TABLET ORAL at 12:43

## 2024-01-31 RX ADMIN — ENOXAPARIN SODIUM 40 MG: 40 INJECTION SUBCUTANEOUS at 18:13

## 2024-01-31 RX ADMIN — SODIUM CHLORIDE: 9 INJECTION, SOLUTION INTRAVENOUS at 13:14

## 2024-01-31 RX ADMIN — DIAZEPAM 2.5 MG: 5 INJECTION, SOLUTION INTRAMUSCULAR; INTRAVENOUS at 12:43

## 2024-01-31 NOTE — ED PROVIDER NOTES
[facility] EMERGENCY DEPT  EMERGENCY DEPARTMENT ENCOUNTER       Pt Name: Lucero Olivas  MRN: [id number]  Birthdate 1960  Date of evaluation: 1/31/2024  Provider: Shauna Franklin DO   PCP: Jonel Molina MD  Note Started: 11:34 AM 1/31/24     CHIEF COMPLAINT       Chief Complaint   Patient presents with    Dizziness     Pt arrives to triage via wheelchair w/ cc dizziness and L sided  weakness x she woke up this am around 0700. Pt LKW around 0100 when she went to sleep. Pt denies any numbness/tingling or changes to speech. Pt was seen at scheduled PCP apt this am and referred to ED d/t sx         HISTORY OF PRESENT ILLNESS: 1 or more elements      History From: Patient  None     Lucero Olivas is a 63 y.o. 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 She states the room is spinning. She went to her PCP because she already had an appointment but they referred her here to the ER. Her last known well was 0100 today. She has no headache, fever, chest pain, abd pain, vomiting, syncope, numbness, tingling.      Nursing Notes were all reviewed and agreed with or any disagreements were addressed in the HPI.     REVIEW OF SYSTEMS      Review of Systems   Neurological:  Positive for dizziness and weakness.   All other systems reviewed and are negative.       Positives and Pertinent negatives as per HPI.    PAST HISTORY     Past Medical History:  Past Medical History:   Diagnosis Date    Menopause          Past Surgical History:  Past Surgical History:   Procedure Laterality Date    GYN  2004    HYSTERECTOMY (CERVIX STATUS UNKNOWN)      ORTHOPEDIC SURGERY         Family History:  Family History   Problem Relation Age of Onset    Dementia Mother     No Known Problems Father        Social History:  Social History     Tobacco Use    Smoking status: Never     Passive exposure: Never    Smokeless tobacco: Never   Vaping Use    Vaping Use: Never used

## 2024-01-31 NOTE — ED NOTES
Verbal  report given to Pattie (oncoming nurse) by Helen (offgoing nurse) for transfer of care to inpatient unit. Report included the following information ED Encounter Summary, ED SBAR, MAR, Recent Results, and Neuro Assessment.

## 2024-01-31 NOTE — PLAN OF CARE
Speech LAnguage Pathology EVALUATION/DISCHARGE    Patient: Lucero Olivas (63 y.o. female)  Date: 1/31/2024  Primary Diagnosis: Vertigo [R42]       Precautions:                     ASSESSMENT :  Based on the objective data described below, the patient presents with seemingly functional oropharyngeal swallow function. Oral structures and functions seemingly WNL (some missing dentition noted). Patient independently fed herself sips of thin via cup/straw, puree, and solid. No overt clinical s/s aspiration noted across PO trials, even when taking consecutive sips thin via cup/straw. SLP educated patient on aspiration precautions. No further acute SLP services warranted at this time. SLP will sign off.    Patient will be discharged from skilled speech-language pathology services at this time.     PLAN :  Recommendations and Planned Interventions:  Diet: Regular and thin liquids  --Medications as tolerated   --Upright all PO intake   --Oral hygiene 2-3x/day       Acute SLP Services: No, patient will be discharged from acute skilled speech-language pathology at this time.    Discharge Recommendations: No, additional SLP treatment not indicated at discharge     SUBJECTIVE:   Patient stated, “I'm fine.”    OBJECTIVE:     Past Medical History:   Diagnosis Date    Menopause      Past Surgical History:   Procedure Laterality Date    GYN  2004    HYSTERECTOMY (CERVIX STATUS UNKNOWN)      ORTHOPEDIC SURGERY       Prior Level of Function/Home Situation:        Baseline Assessment:  Current Diet : Regular  Current Liquid Diet : Thin  Prior Dysphagia History: no previous SLP services documented  Patient Complaint: none    Cognitive and Communication Status:  Neurologic State: Alert  Orientation Level: Oriented to person and Oriented to place  Cognition: Appropriate for age attention/concentration and Follows commands    Dysphagia:  Oral Assessment:  Oral Motor   Labial: No impairment  Dentition: Natural;Limited  Oral Hygiene:

## 2024-01-31 NOTE — ED NOTES
Patient reports she went to bed at 0100 this morning feeling normal. Per pt, she woke up at 0700 with dizziness and left-sided weakness. Pt was seen at PCP for a regular appointment this morning and was referred to the ER for stoke workup.     1130- MD in triage to evaluate pt. Per MD, Code stroke level 2 called overhead.     1200- pt back to room 22 at this time. Patient ambulatory with 1x assist to stretcher. Pt changed into gown, on monitor x3. Bed locked and in low position, call bell within reach, SR x2.     1206- Teleneurology re-paged    1217- Teleneurology re-paged.     1230- MD Maritza Kiner on screen to evaluate

## 2024-01-31 NOTE — H&P
24 1215 139/65 -- -- 51 13 100 % -- --   24 1200 (!) 142/75 97.4 °F (36.3 °C) Oral 64 14 98 % -- --   24 1115 (!) 142/58 -- -- 51 18 100 % 1.575 m (5' 2\") 66.7 kg (147 lb)       Temp (24hrs), Av.4 °F (36.3 °C), Min:97.4 °F (36.3 °C), Max:97.4 °F (36.3 °C)        O2 Device: None (Room air)    Wt Readings from Last 12 Encounters:   24 66.7 kg (147 lb)   12/15/23 67.8 kg (149 lb 6.4 oz)   23 65.8 kg (145 lb)   23 64.9 kg (143 lb)   23 66.1 kg (145 lb 12.8 oz)   22 68.7 kg (151 lb 6.4 oz)   22 66.6 kg (146 lb 12.8 oz)   22 67.9 kg (149 lb 12.8 oz)   21 68 kg (150 lb)         PHYSICAL EXAM:  General:    Alert, cooperative, appears stated age.     Lungs:   CTA b/l.  No wheezing or Rhonchi. No rales.  Chest wall:  No tenderness.  No accessory muscle use.  Heart:   Regular  rhythm,  No  Murmur.   No edema  Abdomen:   Soft, NT. ND  BS+  Extremities: No cyanosis.  No clubbing,      Skin turgor normal, Radial dial pulse 2+. Capillary refill normal  Neurologic: No facial asymmetry. No aphasia or slurred speech. Symmetrical strength, Sensation grossly intact. AAOx4.         LAB DATA REVIEWED:    Recent Results (from the past 12 hour(s))   POCT Glucose    Collection Time: 24 11:30 AM   Result Value Ref Range    POC Glucose 69 65 - 117 mg/dL    Performed by: Joaquina JOLLEY    CBC with Auto Differential    Collection Time: 24 11:35 AM   Result Value Ref Range    WBC 6.9 3.6 - 11.0 K/uL    RBC 4.61 3.80 - 5.20 M/uL    Hemoglobin 13.2 11.5 - 16.0 g/dL    Hematocrit 41.3 35.0 - 47.0 %    MCV 89.6 80.0 - 99.0 FL    MCH 28.6 26.0 - 34.0 PG    MCHC 32.0 30.0 - 36.5 g/dL    RDW 12.8 11.5 - 14.5 %    Platelets 299 150 - 400 K/uL    MPV 9.6 8.9 - 12.9 FL    Nucleated RBCs 0.0 0  WBC    nRBC 0.00 0.00 - 0.01 K/uL    Neutrophils % 60 32 - 75 %    Lymphocytes % 31 12 - 49 %    Monocytes % 7 5 - 13 %    Eosinophils % 1 0 - 7 %    Basophils % 1 0 - 1 %

## 2024-01-31 NOTE — PLAN OF CARE
Problem: Safety - Adult  Goal: Free from fall injury  Flowsheets (Taken 1/31/2024 6102)  Free From Fall Injury:   Instruct family/caregiver on patient safety   Based on caregiver fall risk screen, instruct family/caregiver to ask for assistance with transferring infant if caregiver noted to have fall risk factors

## 2024-02-01 ENCOUNTER — TELEPHONE (OUTPATIENT)
Age: 64
End: 2024-02-01

## 2024-02-01 VITALS
HEART RATE: 55 BPM | SYSTOLIC BLOOD PRESSURE: 127 MMHG | HEIGHT: 62 IN | DIASTOLIC BLOOD PRESSURE: 60 MMHG | WEIGHT: 147 LBS | RESPIRATION RATE: 18 BRPM | TEMPERATURE: 98.1 F | OXYGEN SATURATION: 99 % | BODY MASS INDEX: 27.05 KG/M2

## 2024-02-01 PROBLEM — R29.898 WEAKNESS OF LEFT ARM: Status: ACTIVE | Noted: 2024-02-01

## 2024-02-01 PROBLEM — G45.9 TIA (TRANSIENT ISCHEMIC ATTACK): Status: ACTIVE | Noted: 2024-02-01

## 2024-02-01 LAB
ANION GAP SERPL CALC-SCNC: 4 MMOL/L (ref 5–15)
BUN SERPL-MCNC: 9 MG/DL (ref 6–20)
BUN/CREAT SERPL: 10 (ref 12–20)
CALCIUM SERPL-MCNC: 8.7 MG/DL (ref 8.5–10.1)
CHLORIDE SERPL-SCNC: 111 MMOL/L (ref 97–108)
CHOLEST SERPL-MCNC: 193 MG/DL
CO2 SERPL-SCNC: 27 MMOL/L (ref 21–32)
CREAT SERPL-MCNC: 0.86 MG/DL (ref 0.55–1.02)
EKG ATRIAL RATE: 53 BPM
EKG DIAGNOSIS: NORMAL
EKG P AXIS: 85 DEGREES
EKG P-R INTERVAL: 196 MS
EKG Q-T INTERVAL: 488 MS
EKG QRS DURATION: 86 MS
EKG QTC CALCULATION (BAZETT): 457 MS
EKG R AXIS: -37 DEGREES
EKG T AXIS: 4 DEGREES
EKG VENTRICULAR RATE: 53 BPM
ERYTHROCYTE [DISTWIDTH] IN BLOOD BY AUTOMATED COUNT: 13 % (ref 11.5–14.5)
EST. AVERAGE GLUCOSE BLD GHB EST-MCNC: 114 MG/DL
GLUCOSE SERPL-MCNC: 116 MG/DL (ref 65–100)
HBA1C MFR BLD: 5.6 % (ref 4–5.6)
HCT VFR BLD AUTO: 36.8 % (ref 35–47)
HDLC SERPL-MCNC: 53 MG/DL
HDLC SERPL: 3.6 (ref 0–5)
HGB BLD-MCNC: 11.9 G/DL (ref 11.5–16)
LDLC SERPL CALC-MCNC: 122 MG/DL (ref 0–100)
MAGNESIUM SERPL-MCNC: 2.3 MG/DL (ref 1.6–2.4)
MCH RBC QN AUTO: 28.8 PG (ref 26–34)
MCHC RBC AUTO-ENTMCNC: 32.3 G/DL (ref 30–36.5)
MCV RBC AUTO: 89.1 FL (ref 80–99)
NRBC # BLD: 0 K/UL (ref 0–0.01)
NRBC BLD-RTO: 0 PER 100 WBC
PLATELET # BLD AUTO: 259 K/UL (ref 150–400)
PMV BLD AUTO: 9.6 FL (ref 8.9–12.9)
POTASSIUM SERPL-SCNC: 3.5 MMOL/L (ref 3.5–5.1)
RBC # BLD AUTO: 4.13 M/UL (ref 3.8–5.2)
SODIUM SERPL-SCNC: 142 MMOL/L (ref 136–145)
TRIGL SERPL-MCNC: 90 MG/DL
VLDLC SERPL CALC-MCNC: 18 MG/DL
WBC # BLD AUTO: 6.1 K/UL (ref 3.6–11)

## 2024-02-01 PROCEDURE — 6370000000 HC RX 637 (ALT 250 FOR IP): Performed by: INTERNAL MEDICINE

## 2024-02-01 PROCEDURE — 97116 GAIT TRAINING THERAPY: CPT

## 2024-02-01 PROCEDURE — 2580000003 HC RX 258: Performed by: INTERNAL MEDICINE

## 2024-02-01 PROCEDURE — 80061 LIPID PANEL: CPT

## 2024-02-01 PROCEDURE — 83036 HEMOGLOBIN GLYCOSYLATED A1C: CPT

## 2024-02-01 PROCEDURE — 85027 COMPLETE CBC AUTOMATED: CPT

## 2024-02-01 PROCEDURE — 96372 THER/PROPH/DIAG INJ SC/IM: CPT

## 2024-02-01 PROCEDURE — 80048 BASIC METABOLIC PNL TOTAL CA: CPT

## 2024-02-01 PROCEDURE — 97165 OT EVAL LOW COMPLEX 30 MIN: CPT

## 2024-02-01 PROCEDURE — 97535 SELF CARE MNGMENT TRAINING: CPT

## 2024-02-01 PROCEDURE — G0378 HOSPITAL OBSERVATION PER HR: HCPCS

## 2024-02-01 PROCEDURE — 83735 ASSAY OF MAGNESIUM: CPT

## 2024-02-01 PROCEDURE — 97161 PT EVAL LOW COMPLEX 20 MIN: CPT

## 2024-02-01 PROCEDURE — 36415 COLL VENOUS BLD VENIPUNCTURE: CPT

## 2024-02-01 PROCEDURE — 6360000002 HC RX W HCPCS: Performed by: INTERNAL MEDICINE

## 2024-02-01 RX ORDER — ATORVASTATIN CALCIUM 40 MG/1
40 TABLET, FILM COATED ORAL NIGHTLY
Qty: 30 TABLET | Refills: 3 | Status: SHIPPED | OUTPATIENT
Start: 2024-02-01

## 2024-02-01 RX ORDER — ASPIRIN 81 MG/1
81 TABLET, CHEWABLE ORAL DAILY
Qty: 30 TABLET | Refills: 3 | Status: SHIPPED | OUTPATIENT
Start: 2024-02-02

## 2024-02-01 RX ORDER — ATORVASTATIN CALCIUM 40 MG/1
40 TABLET, FILM COATED ORAL NIGHTLY
Status: DISCONTINUED | OUTPATIENT
Start: 2024-02-01 | End: 2024-02-01 | Stop reason: HOSPADM

## 2024-02-01 RX ADMIN — ENOXAPARIN SODIUM 40 MG: 40 INJECTION SUBCUTANEOUS at 10:03

## 2024-02-01 RX ADMIN — ASPIRIN 81 MG: 81 TABLET, CHEWABLE ORAL at 10:03

## 2024-02-01 RX ADMIN — SODIUM CHLORIDE, PRESERVATIVE FREE 10 ML: 5 INJECTION INTRAVENOUS at 10:04

## 2024-02-01 RX ADMIN — SODIUM CHLORIDE: 9 INJECTION, SOLUTION INTRAVENOUS at 00:21

## 2024-02-01 NOTE — PROGRESS NOTES
.2/1/2024        RE: Lucero Olivas         [address] [facility] [address]          To Whom It May Concern,      This is to certify that, Lucero Olivas may return to work on  Feb,5,2024. Call us with any questions.        Sincerely,    Eh Reyes MD   [phone]  
Attempted to deliver and verbally explain the MOON with patient. Patient was with clinical staff. Mable Craig, Care Management Assistant  
End of Shift Note    Bedside shift change report given to JOZEF Arvizu (oncoming nurse) by Mis Crump LPN (offgoing nurse).  Report included the following information ED Encounter Summary, MAR, Recent Results, Cardiac Rhythm SR, and Neuro Assessment      Shift worked: Nights   Shift summary and any significant changes:     No significant changes during the night, Labs done     Concerns for physician to address:  none   Zone phone for oncoming shift:   [phone]     Patient Information  Lucero Olivas  63 y.o.  1/31/2024 11:41 AM by Saw Pacheco MD. Lucero Olivas was admitted from Home    Problem List  Patient Active Problem List    Diagnosis Date Noted    Vertigo 01/31/2024    Neuropathic pain of both legs 09/05/2017     Past Medical History:   Diagnosis Date    Menopause        Core Measures:  CVA: Yes Yes  CHF:No No  PNA:NoNo    Activity:     Number times ambulated in hallways past shift: 0  Number of times OOB to chair past shift: 0    Cardiac:   Cardiac Monitoring: Yes           Access:   Current line(s): PIV  Central Line? No   PICC LINE? No     Genitourinary:   Urinary status: voiding   Urinary Catheter? No     Respiratory:   O2 Device: None (Room air)  Chronic home O2 use?: no  Incentive spirometer at bedside: no       GI:  Last BM (including prior to admit): 01/30/24  Current diet:  ADULT DIET; Regular  DIET ONE TIME MESSAGE;  Passing flatus: yes  Tolerating current diet: yes       Pain Management:   Patient states pain is manageable on current regimen: yes    Skin:  Archie Scale Score: 21  Interventions: increase time out of bed and PT/OT consult    Patient Safety:  Fall Score: Kimbrough Total Score: 20  Interventions: bed/chair alarm, gripper socks, pt to call before getting OOB, and stay with me (per policy)     @Rollbelt  @dexterity to release roll belt  Yes/No ( must document dexterity  here by stating Yes or No here, otherwise this is a restraint and must follow restraint documentation policy.)    DVT 
End of Shift Note    Bedside shift change report given to Mis (oncoming nurse) by MATEUS BARBOZA RN (offgoing nurse).  Report included the following information ED Encounter Summary, MAR, Recent Results, Cardiac Rhythm SR, and Neuro Assessment      Shift worked: Days   Shift summary and any significant changes:     New admit       Concerns for physician to address:  none   Zone phone for oncoming shift:   [phone]     Patient Information  Lucero Olivas  63 y.o.  1/31/2024 11:41 AM by Saw Pacheco MD. Lucero Olivas was admitted from Home    Problem List  Patient Active Problem List    Diagnosis Date Noted    Vertigo 01/31/2024    Neuropathic pain of both legs 09/05/2017     Past Medical History:   Diagnosis Date    Menopause        Core Measures:  CVA: Yes Yes  CHF:No No  PNA:NoNo    Activity:     Number times ambulated in hallways past shift: 0  Number of times OOB to chair past shift: 0    Cardiac:   Cardiac Monitoring: Yes           Access:   Current line(s): PIV  Central Line? No   PICC LINE? No     Genitourinary:   Urinary status: voiding   Urinary Catheter? No     Respiratory:   O2 Device: None (Room air)  Chronic home O2 use?: no  Incentive spirometer at bedside: no       GI:  Last BM (including prior to admit): 01/30/24  Current diet:  ADULT DIET; Regular  DIET ONE TIME MESSAGE;  Passing flatus: yes  Tolerating current diet: yes       Pain Management:   Patient states pain is manageable on current regimen: yes    Skin:  Archie Scale Score: 22  Interventions: increase time out of bed and PT/OT consult    Patient Safety:  Fall Score: Kimbrough Total Score: 0  Interventions: bed/chair alarm, gripper socks, pt to call before getting OOB, and stay with me (per policy)     @Rollbelt  @dexterity to release roll belt  Yes/No ( must document dexterity  here by stating Yes or No here, otherwise this is a restraint and must follow restraint documentation policy.)    DVT prophylaxis:  DVT prophylaxis Med- yes  DVT 
MRI PENDING    Completion of MRI Screening Sheet    Fax to [phone] when completed    Please call [phone]  When this is done    Thank You  
Orders received, chart reviewed and patient evaluated by physical therapy. Pending progression with skilled acute physical therapy, recommend:  No skilled physical therapy    Recommend with nursing OOB to chair 3x/day and walking daily with 1 assist and  stand by . Thank you for completing as able in order to maintain patient strength, endurance and independence.     Full evaluation to follow.     
PHYSICAL THERAPY EVALUATION/DISCHARGE    Patient: Lucero Olivas (63 y.o. female)  Date: 2/1/2024  Primary Diagnosis: Dizziness [R42]  Vertigo [R42]  Weakness of left arm [R29.898]       Precautions:                        ASSESSMENT AND RECOMMENDATIONS:  Based on the objective data below, the patient with baseline mobilize. She was received in supine and cleared by nursing to mobilize. She performed all mobility at an independent level and no loss of balance noted. Denied any dizziness during session. Educated on BEFAST, MRI negative.     Functional Outcome Measure:  The patient scored 24/24 on the [facility] outcome measure which is indicative of good functional mobility.          Further skilled acute physical therapy is not indicated at this time.       PLAN :  Recommendation for discharge: (in order for the patient to meet his/her long term goals): No skilled physical therapy    Other factors to consider for discharge: no additional factors    IF patient discharges home will need the following DME: none       SUBJECTIVE:   Patient stated “I work at [employer] .”    OBJECTIVE DATA SUMMARY:     Past Medical History:   Diagnosis Date    Menopause      Past Surgical History:   Procedure Laterality Date    GYN  2004    HYSTERECTOMY (CERVIX STATUS UNKNOWN)      ORTHOPEDIC SURGERY         Home Situation and Prior Level of Function: independent and working as    Social/Functional History  Lives With: Alone  Type of Home: House  Home Layout: One level  Home Access: Stairs to enter without rails  Entrance Stairs - Number of Steps: 3  Entrance Stairs - Rails: None  Bathroom Shower/Tub: Tub/Shower unit  Bathroom Equipment: None  Home Equipment: Crutches  Critical Behavior:  Orientation  Overall Orientation Status: Within Normal Limits  Orientation Level: Oriented X4  Cognition  Overall Cognitive Status: WNL    Hearing:   Hearing  Hearing: Within functional limits    Vision/Perceptual:        
Patient and daughter given discharge instructions. Opportunity given to ask questions and answer all questions answered.  Patient did request Note from MD to return to work.   
2004    HYSTERECTOMY (CERVIX STATUS UNKNOWN)      ORTHOPEDIC SURGERY         Prior Level of Function/Environment/Context: Patient was independent in ADLs and functional mobility. She works in housekeeping at an elementary school.  ,  ,  ,  ,  ,  ,  ,  ,  ,  ,       Expanded or extensive additional review of patient history:   Lives With: Alone  Type of Home: House  Home Layout: One level  Home Access: Stairs to enter without rails     Entrance Stairs - Rails: None  Bathroom Shower/Tub: Tub/Shower unit     Bathroom Equipment: None     Home Equipment: Crutches         Hand Dominance: right     EXAMINATION OF PERFORMANCE DEFICITS:    Cognitive/Behavioral Status:    Orientation  Overall Orientation Status: Within Normal Limits  Orientation Level: Oriented X4  Cognition  Overall Cognitive Status: WNL    Hearing:   Hearing  Hearing: Within functional limits    Vision/Perceptual:          Vision  Vision: Impaired  Vision Exceptions: Wears glasses at all times       Range of Motion:   AROM: Within functional limits  PROM: Within functional limits      Strength:  Strength: Within functional limits      Coordination:  Coordination: Within functional limits            Tone & Sensation:   Tone: Normal  Sensation: Intact      Functional Mobility and Transfers for ADLs:  Bed Mobility:     Bed Mobility Training  Bed Mobility Training: Yes  Overall Level of Assistance: Independent  Rolling: Independent  Supine to Sit: Independent  Scooting: Independent    Transfers:     Transfer Training  Transfer Training: Yes  Overall Level of Assistance: Independent  Sit to Stand: Independent  Stand to Sit: Independent  Bed to Chair: Independent                                   Balance:   Standing: Intact  Balance  Sitting: Intact  Standing: Intact      ADL Assessment:          Feeding: Independent       Grooming: Independent       UE Bathing: Independent            LE Bathing: Independent       UE Dressing: Independent       LE Dressing:

## 2024-02-01 NOTE — PLAN OF CARE
Problem: Discharge Planning  Goal: Discharge to home or other facility with appropriate resources  2/1/2024 1041 by Luh Schmid RN  Outcome: Progressing  2/1/2024 1041 by Luh Schmid RN  Outcome: Progressing  2/1/2024 1040 by Luh Schmid RN  Outcome: Progressing  1/31/2024 2151 by Mis Crump LPN  Outcome: Progressing     Problem: Safety - Adult  Goal: Free from fall injury  2/1/2024 1041 by Luh Schmid RN  Outcome: Progressing  2/1/2024 1041 by Luh Schmid RN  Outcome: Progressing  2/1/2024 1040 by Luh Schmid RN  Outcome: Progressing  1/31/2024 2151 by Mis Crump LPN  Outcome: Progressing

## 2024-02-01 NOTE — CONSULTS
Neurology Note    Patient ID:  Lucero Olivas  [id number]  63 y.o.  1960      Date of Consultation:  February 1, 2024    Referring Physician: Dr. Reyes    Reason for Consultation:        Assessment and Plan:    The patient is a pleasant 63-year-old female who presented to the hospital with acute onset of dizziness, vertigo and left-sided weakness and numbness.  Symptoms have improved since admission.    Acute onset dizziness/left-sided weakness:  Head CT with no acute abnormality  Brain MRI with no evidence of an acute stroke.  There was mild chronic microvascular ischemic disease  CTA with no large vessel flow-limiting stenosis  This would be most consistent with a TIA.  Less likely benign positional vertigo due to the focal symptoms in her left upper extremity  The patient should be on 81 mg aspirin  Dyslipidemia: Updated .  Goal LDL is less than 70.  Patient should be on statin therapy  Hypertension: Aggressive control with goal blood pressure of less than 140/90  Echocardiogram completed  Hemoglobin A1c was ordered  Continue aggressive glycemic control    I provided tia/stroke education today to the patient and her daughter today in regards to risk factors for stroke and lifestyle modifications to help minimize the risk of future stroke.  This included medication compliance, regular follow up with primary care physician,  and healthy lifestyle habits (nutrition/exercise)     There is no other additional neurology recommendations at this time.  If questions arise, please not hesitate to contact me and I will return to see the patient.  The patient should follow-up in clinic in approximately 4 weeks time after discharge. Care plan discussed with Dr. Reyes           Subjective: I became dizzy and my left side was weak       History of Present Illness:   Lucero Olivas is a 63 y.o. female with no significant chronic medical conditions who awoke in the day upon admission with severe dizziness.  She had

## 2024-02-01 NOTE — CARE COORDINATION
1238 - Planning d/c today, family at bedside to transport. SMAARTER tool completed and on door frame. Full assessment to follow if able, otherwise pt is clear from CM for d/c.     Lilli Sarah MSW  Care Management

## 2024-02-01 NOTE — DISCHARGE SUMMARY
Discharge Summary    Name: Lucero Olivas  [id number]  YOB: 1960 (Age: 63 y.o.)   Date of Admission: 1/31/2024  Date of Discharge: 2/1/2024  Attending Physician: Eh Reyes MD    Discharge Diagnosis:   TIA    Consultations:  IP CONSULT TO NEUROLOGY      Brief Admission History/Reason for Admission Per Saw Pacheco MD:   Lucero Olivas is a 63 y.o.  female with PMHx significant for menopause is coming the hospital chief complaints of dizziness and also left-sided weakness.  Patient reports that she started having dizziness this morning along with left arm numbness and also had difficulty speaking for which she went to PCP and was referred to emergency department for further evaluation.  She does not report any symptoms on the right side.  Does not report any vision deficits.  Does not report any chest pain or shortness of breath.     On arrival to emergency department, she was noted to have stable vitals, on labs BMP is within normal limits, troponin is normal, LFTs are normal.  CBC is within normal limits.  EKG shows sinus bradycardia.  CT head shows no acute process.  CTA perfusion shows no significant occlusion.  We were asked to admit for work up and evaluation of the above problems.     Brief Hospital Course by Main Problems:   Dizziness, left-sided weakness rule out posterior circulation CVA  -CT head is within normal limits.  CT brain perfusion and CTA shows no significant occlusion  -EKG shows normal sinus rhythm  -MRI brain negative for acute CVA  Echocardiogram with normal EF, no WMA.  No PFO  Telemetry showed sinus rhythm, with heart rate 50s to 60s.  Asymptomatic  Seen by neurologist, recommends aspirin and statin.  Episode likely TIA.        Menopause       Discharge Exam:  Patient seen and examined by me on discharge day.  Pertinent Findings:  Patient Vitals for the past 24 hrs:   BP Temp Temp src Pulse Resp SpO2 Height Weight   02/01/24 0853 123/80

## 2024-02-01 NOTE — PLAN OF CARE
Problem: Discharge Planning  Goal: Discharge to home or other facility with appropriate resources  Outcome: Progressing     Problem: Safety - Adult  Goal: Free from fall injury  1/31/2024 2151 by Mis Crump LPN  Outcome: Progressing  1/31/2024 1834 by Pattie Granados, RN  Flowsheets (Taken 1/31/2024 1834)  Free From Fall Injury:   Instruct family/caregiver on patient safety   Based on caregiver fall risk screen, instruct family/caregiver to ask for assistance with transferring infant if caregiver noted to have fall risk factors

## 2024-02-01 NOTE — TELEPHONE ENCOUNTER
Hi,    Can this patient be scheduled for hospital follow-up in 4 to 8 weeks with any other neurology providers for a TIA?    Thanks!

## 2024-02-01 NOTE — PLAN OF CARE
Problem: Discharge Planning  Goal: Discharge to home or other facility with appropriate resources  2/1/2024 1354 by Luh Schmid, RN  Outcome: Completed  2/1/2024 1041 by Luh Schmid RN  Outcome: Progressing  2/1/2024 1041 by Luh Schmid RN  Outcome: Progressing  2/1/2024 1040 by Luh Schmid RN  Outcome: Progressing     Problem: Safety - Adult  Goal: Free from fall injury  2/1/2024 1354 by Luh Schmid RN  Outcome: Completed  2/1/2024 1041 by Luh Schmid RN  Outcome: Progressing  2/1/2024 1041 by Luh Schmid, RN  Outcome: Progressing  2/1/2024 1040 by Luh Schmid RN  Outcome: Progressing

## 2024-02-17 ENCOUNTER — APPOINTMENT (OUTPATIENT)
Facility: HOSPITAL | Age: 64
End: 2024-02-17
Payer: MEDICARE

## 2024-02-17 ENCOUNTER — HOSPITAL ENCOUNTER (EMERGENCY)
Facility: HOSPITAL | Age: 64
Discharge: HOME | End: 2024-02-17
Attending: EMERGENCY MEDICINE
Payer: MEDICARE

## 2024-02-17 VITALS
SYSTOLIC BLOOD PRESSURE: 115 MMHG | HEIGHT: 63 IN | DIASTOLIC BLOOD PRESSURE: 69 MMHG | RESPIRATION RATE: 20 BRPM | OXYGEN SATURATION: 99 % | BODY MASS INDEX: 26.4 KG/M2 | TEMPERATURE: 99.7 F | HEART RATE: 87 BPM | WEIGHT: 149 LBS

## 2024-02-17 DIAGNOSIS — R74.01 TRANSAMINITIS: ICD-10-CM

## 2024-02-17 DIAGNOSIS — N30.01 ACUTE CYSTITIS WITH HEMATURIA: ICD-10-CM

## 2024-02-17 DIAGNOSIS — R50.9 FEVER, UNSPECIFIED FEVER CAUSE: Primary | ICD-10-CM

## 2024-02-17 LAB
-: NORMAL
ALBUMIN SERPL-MCNC: 3.2 G/DL (ref 3.5–5)
ALBUMIN/GLOB SERPL: 0.6 (ref 1.1–2.2)
ALP SERPL-CCNC: 290 U/L (ref 45–117)
ALT SERPL-CCNC: 380 U/L (ref 12–78)
ANION GAP SERPL CALC-SCNC: 9 MMOL/L (ref 5–15)
APPEARANCE UR: CLEAR
AST SERPL-CCNC: 265 U/L (ref 15–37)
BACTERIA URNS QL MICRO: ABNORMAL /HPF
BASOPHILS # BLD: 0.1 K/UL (ref 0–0.1)
BASOPHILS NFR BLD: 1 % (ref 0–1)
BILIRUB SERPL-MCNC: 0.9 MG/DL (ref 0.2–1)
BILIRUB UR QL CFM: NEGATIVE
BUN SERPL-MCNC: 10 MG/DL (ref 6–20)
BUN/CREAT SERPL: 10 (ref 12–20)
CALCIUM SERPL-MCNC: 9 MG/DL (ref 8.5–10.1)
CHLORIDE SERPL-SCNC: 106 MMOL/L (ref 97–108)
CO2 SERPL-SCNC: 26 MMOL/L (ref 21–32)
COLOR UR: ABNORMAL
COMMENT:: NORMAL
CREAT SERPL-MCNC: 1 MG/DL (ref 0.55–1.02)
DIFFERENTIAL METHOD BLD: ABNORMAL
EKG ATRIAL RATE: 80 BPM
EKG DIAGNOSIS: NORMAL
EKG P AXIS: 24 DEGREES
EKG P-R INTERVAL: 184 MS
EKG Q-T INTERVAL: 376 MS
EKG QRS DURATION: 96 MS
EKG QTC CALCULATION (BAZETT): 433 MS
EKG R AXIS: -42 DEGREES
EKG T AXIS: 19 DEGREES
EKG VENTRICULAR RATE: 80 BPM
EOSINOPHIL # BLD: 0.4 K/UL (ref 0–0.4)
EOSINOPHIL NFR BLD: 4 % (ref 0–7)
EPITH CASTS URNS QL MICRO: ABNORMAL /LPF
ERYTHROCYTE [DISTWIDTH] IN BLOOD BY AUTOMATED COUNT: 12.7 % (ref 11.5–14.5)
FLUAV AG NPH QL IA: NEGATIVE
FLUBV AG NOSE QL IA: NEGATIVE
GLOBULIN SER CALC-MCNC: 5 G/DL (ref 2–4)
GLUCOSE BLD STRIP.AUTO-MCNC: 88 MG/DL (ref 65–117)
GLUCOSE SERPL-MCNC: 94 MG/DL (ref 65–100)
GLUCOSE UR STRIP.AUTO-MCNC: NEGATIVE MG/DL
HAV IGM SER QL: NONREACTIVE
HBV CORE IGM SER QL: NONREACTIVE
HBV SURFACE AG SER QL: <0.1 INDEX
HBV SURFACE AG SER QL: NEGATIVE
HCT VFR BLD AUTO: 38.5 % (ref 35–47)
HCV AB SER IA-ACNC: 0.21 INDEX
HCV AB SERPL QL IA: NONREACTIVE
HGB BLD-MCNC: 12.8 G/DL (ref 11.5–16)
HGB UR QL STRIP: ABNORMAL
HYALINE CASTS URNS QL MICRO: ABNORMAL /LPF (ref 0–2)
IMM GRANULOCYTES # BLD AUTO: 0.1 K/UL (ref 0–0.04)
IMM GRANULOCYTES NFR BLD AUTO: 1 % (ref 0–0.5)
KETONES UR QL STRIP.AUTO: ABNORMAL MG/DL
LACTATE BLD-SCNC: 0.96 MMOL/L (ref 0.4–2)
LEUKOCYTE ESTERASE UR QL STRIP.AUTO: ABNORMAL
LYMPHOCYTES # BLD: 1.1 K/UL (ref 0.8–3.5)
LYMPHOCYTES NFR BLD: 12 % (ref 12–49)
MCH RBC QN AUTO: 29.6 PG (ref 26–34)
MCHC RBC AUTO-ENTMCNC: 33.2 G/DL (ref 30–36.5)
MCV RBC AUTO: 88.9 FL (ref 80–99)
MONOCYTES # BLD: 0.6 K/UL (ref 0–1)
MONOCYTES NFR BLD: 7 % (ref 5–13)
NEUTS SEG # BLD: 6.9 K/UL (ref 1.8–8)
NEUTS SEG NFR BLD: 75 % (ref 32–75)
NITRITE UR QL STRIP.AUTO: NEGATIVE
NRBC # BLD: 0 K/UL (ref 0–0.01)
NRBC BLD-RTO: 0 PER 100 WBC
PH UR STRIP: 7 (ref 5–8)
PLATELET # BLD AUTO: 267 K/UL (ref 150–400)
PMV BLD AUTO: 9.8 FL (ref 8.9–12.9)
POTASSIUM SERPL-SCNC: 3.2 MMOL/L (ref 3.5–5.1)
PROCALCITONIN SERPL-MCNC: 0.23 NG/ML
PROT SERPL-MCNC: 8.2 G/DL (ref 6.4–8.2)
PROT UR STRIP-MCNC: 30 MG/DL
RBC # BLD AUTO: 4.33 M/UL (ref 3.8–5.2)
RBC #/AREA URNS HPF: ABNORMAL /HPF (ref 0–5)
SARS-COV-2 RDRP RESP QL NAA+PROBE: NOT DETECTED
SERVICE CMNT-IMP: NORMAL
SODIUM SERPL-SCNC: 141 MMOL/L (ref 136–145)
SOURCE: NORMAL
SP GR UR REFRACTOMETRY: 1.02
SPECIMEN HOLD: NORMAL
TROPONIN I SERPL HS-MCNC: 7 NG/L (ref 0–51)
URINE CULTURE IF INDICATED: ABNORMAL
UROBILINOGEN UR QL STRIP.AUTO: 4 EU/DL (ref 0.2–1)
WBC # BLD AUTO: 9.1 K/UL (ref 3.6–11)
WBC URNS QL MICRO: ABNORMAL /HPF (ref 0–4)

## 2024-02-17 PROCEDURE — 99285 EMERGENCY DEPT VISIT HI MDM: CPT

## 2024-02-17 PROCEDURE — 87086 URINE CULTURE/COLONY COUNT: CPT

## 2024-02-17 PROCEDURE — 96365 THER/PROPH/DIAG IV INF INIT: CPT

## 2024-02-17 PROCEDURE — 6360000002 HC RX W HCPCS: Performed by: EMERGENCY MEDICINE

## 2024-02-17 PROCEDURE — 71045 X-RAY EXAM CHEST 1 VIEW: CPT

## 2024-02-17 PROCEDURE — 85025 COMPLETE CBC W/AUTO DIFF WBC: CPT

## 2024-02-17 PROCEDURE — 93005 ELECTROCARDIOGRAM TRACING: CPT | Performed by: EMERGENCY MEDICINE

## 2024-02-17 PROCEDURE — 2580000003 HC RX 258: Performed by: EMERGENCY MEDICINE

## 2024-02-17 PROCEDURE — 36415 COLL VENOUS BLD VENIPUNCTURE: CPT

## 2024-02-17 PROCEDURE — 81001 URINALYSIS AUTO W/SCOPE: CPT

## 2024-02-17 PROCEDURE — 84145 PROCALCITONIN (PCT): CPT

## 2024-02-17 PROCEDURE — 84484 ASSAY OF TROPONIN QUANT: CPT

## 2024-02-17 PROCEDURE — 83605 ASSAY OF LACTIC ACID: CPT

## 2024-02-17 PROCEDURE — 82962 GLUCOSE BLOOD TEST: CPT

## 2024-02-17 PROCEDURE — 87635 SARS-COV-2 COVID-19 AMP PRB: CPT

## 2024-02-17 PROCEDURE — 87804 INFLUENZA ASSAY W/OPTIC: CPT

## 2024-02-17 PROCEDURE — 76705 ECHO EXAM OF ABDOMEN: CPT

## 2024-02-17 PROCEDURE — 87040 BLOOD CULTURE FOR BACTERIA: CPT

## 2024-02-17 PROCEDURE — 80053 COMPREHEN METABOLIC PANEL: CPT

## 2024-02-17 PROCEDURE — 80074 ACUTE HEPATITIS PANEL: CPT

## 2024-02-17 RX ORDER — CEPHALEXIN 500 MG/1
500 CAPSULE ORAL 2 TIMES DAILY
Qty: 14 CAPSULE | Refills: 0 | Status: SHIPPED | OUTPATIENT
Start: 2024-02-17 | End: 2024-02-17

## 2024-02-17 RX ORDER — CEPHALEXIN 500 MG/1
500 CAPSULE ORAL 2 TIMES DAILY
Qty: 14 CAPSULE | Refills: 0 | Status: SHIPPED | OUTPATIENT
Start: 2024-02-17 | End: 2024-02-24

## 2024-02-17 RX ADMIN — SODIUM CHLORIDE 1000 MG: 900 INJECTION INTRAVENOUS at 15:07

## 2024-02-17 ASSESSMENT — PAIN SCALES - GENERAL: PAINLEVEL_OUTOF10: 9

## 2024-02-17 NOTE — ED NOTES
Pt's IV removed. Pt provided D/C instructions and states understanding of D/C teaching. Pt has all belongings. Pt escorted out via wheelchair out of ED to personal vehicle at this time.  Patient is A&Ox4, on RA, VSS, and is in NAD at the time of discharge.

## 2024-02-17 NOTE — ED PROVIDER NOTES
2 /lpf   Bilirubin, Confirmatory    Collection Time: 02/17/24  1:43 PM   Result Value Ref Range    Bilirubin Confirmation, UA Negative         EKG: If performed, independent interpretation documented below in the MDM section     RADIOLOGY:  Non-plain film images such as CT, Ultrasound and MRI are read by the radiologist. Plain radiographic images are visualized and preliminarily interpreted by the ED Provider with the findings documented in the MDM section.     Interpretation per the Radiologist below, if available at the time of this note:     US ABDOMEN LIMITED Specify organ? LIVER   Final Result   Normal right upper quadrant ultrasound.      XR CHEST PORTABLE   Final Result      No acute process on portable chest.           PROCEDURES   Unless otherwise noted below or in ED Course, none  Procedures     CRITICAL CARE TIME       EMERGENCY DEPARTMENT COURSE and DIFFERENTIAL DIAGNOSIS/MDM   Vitals:    Vitals:    02/17/24 1224   BP: 109/71   Pulse: (!) 111   Resp: 18   Temp: 99.7 °F (37.6 °C)   SpO2: 97%   Weight: 67.6 kg (149 lb)   Height: 1.6 m (5' 3\")        Patient was given the following medications:  Medications - No data to display    Medical Decision Making  63-year-old with intermittent fever and chills at home, mildly tachycardic on arrival here and afebrile    Mild urinary frequency otherwise denies focal complaint.  Diffuse mild abdominal discomfort without true tenderness.  Will obtain blood cultures and sepsis screening labs, x-ray particular given her recent procedure to evaluate for pneumonia, UA with urinary symptoms.  Will follow-up labs and urinalysis.    Amount and/or Complexity of Data Reviewed  External Data Reviewed: notes.     Details: MRI ruled out stroke on recent admission  Labs: ordered. Decision-making details documented in ED Course.  Radiology: ordered and independent interpretation performed. Decision-making details documented in ED Course.  ECG/medicine tests: ordered and independent  interpretation performed. Decision-making details documented in ED Course.    Risk  Prescription drug management.      ED Course as of 02/17/24 1419   Sat Feb 17, 2024   1324 Lactate is normal glucose 88 [WB]   1324 History of the chest shows no acute process [WB]   1326 EKG obtained and interpreted by myself shows normal sinus rhythm at a rate of 80, leftward axis, LVH, no STEMI no peaked T waves [WB]   1337 Blood counts are normal [WB]   1354 LFTs are elevated today 380 and 265 with elevated alkaline phosphatase; bilirubin is normal [WB]   1404 She does have some abdominal discomfort, will order hepatitis panel, ultrasound [WB]   1405 Procalcitonin is low at 0.23 lowering suspicion for sepsis UA is pending [WB]   1414 Urine shows moderate blood, moderate leukocyte esterase 10-20 white blood cells with many epithelial and 3 bacteria she is having urinary frequency, will treat acute urinary tract infection [WB]   1417 Consider statin toxicity will have her hold her atorvastatin [WB]      ED Course User Index  [WB] Jyadon Tanner MD     FINAL IMPRESSION     1. Fever, unspecified fever cause    2. Transaminitis    3. Acute cystitis with hematuria       DISPOSITION/PLAN   Lucero Olivas's  results have been reviewed with her.  She has been counseled regarding her diagnosis, treatment, and plan.  She verbally conveys understanding and agreement of the signs, symptoms, diagnosis, treatment and prognosis and additionally agrees to follow up as discussed.  She also agrees with the care-plan and conveys that all of her questions have been answered.  I have also provided discharge instructions for her that include: educational information regarding their diagnosis and treatment, and list of reasons why they would want to return to the ED prior to their follow-up appointment, should her condition change.     CLINICAL IMPRESSION    Discharge Note: The patient is stable for discharge home. The signs, symptoms, diagnosis, and

## 2024-02-17 NOTE — DISCHARGE INSTRUCTIONS
Please stop your atorvastatin until you are able to follow-up with your PCP.    US ABDOMEN LIMITED Specify organ? LIVER    Result Date: 2/17/2024  ULTRASOUND OF THE RIGHT UPPER QUADRANT INDICATION: transaminitis, fever COMPARISON: None. TECHNIQUE: Sonography of the right upper quadrant was performed. FINDINGS: LIVER: Normal echogenicity. No focal hepatic mass or intrahepatic biliary dilatation is shown. Portal vein is patent with appropriate direction of flow GALLBLADDER: Contracted COMMON DUCT: 0.6 cm in diameter. The duct is normal caliber. PANCREAS: The visualized portions of the pancreas are normal. RIGHT KIDNEY: 10.1 cm in length. No hydronephrosis, shadowing calculus, or contour-deforming renal mass. 4 mm hypoechoic structure within the right kidney is difficult to further characterize but of doubtful clinical significance     Normal right upper quadrant ultrasound.    XR CHEST PORTABLE    Result Date: 2/17/2024  EXAM:  XR CHEST PORTABLE INDICATION: Sepsis COMPARISON: none TECHNIQUE: portable chest AP view FINDINGS: The cardiac silhouette is within normal limits. The pulmonary vasculature is within normal limits. The lungs and pleural spaces are clear. The visualized bones and upper abdomen are age-appropriate.     No acute process on portable chest.     Echo (TTE) complete (PRN contrast/bubble/strain/3D)    Result Date: 1/31/2024    Left Ventricle: Normal left ventricular systolic function with a visually estimated EF of 55 - 60%. Left ventricle size is normal. Normal wall thickness. Normal wall motion. Normal diastolic function.   Aortic Valve: Trileaflet valve.   Interatrial Septum: Agitated saline study was negative with and without provocation.   Image quality is adequate.     MRI BRAIN WO CONTRAST    Result Date: 1/31/2024  EXAM: MRI BRAIN WO CONTRAST INDICATION: dizzzy, L side weakness COMPARISON: CTA head neck 1/31/2024. CONTRAST: None. TECHNIQUE:  Multiplanar multisequence acquisition without contrast  Time: 02/17/24  1:13 PM   Result Value Ref Range    POC Glucose 88 65 - 117 mg/dL    Performed by: JAIR Fishman RN    POC Lactic Acid    Collection Time: 02/17/24  1:14 PM   Result Value Ref Range    POC Lactic Acid 0.96 0.40 - 2.00 mmol/L   EKG 12 Lead    Collection Time: 02/17/24  1:24 PM   Result Value Ref Range    Ventricular Rate 80 BPM    Atrial Rate 80 BPM    P-R Interval 184 ms    QRS Duration 96 ms    Q-T Interval 376 ms    QTc Calculation (Bazett) 433 ms    P Axis 24 degrees    R Axis -42 degrees    T Axis 19 degrees    Diagnosis       Normal sinus rhythm  Left axis deviation  Minimal voltage criteria for LVH, may be normal variant  When compared with ECG of 31-JAN-2024 12:11,  Vent. rate has increased BY  27 BPM     Urinalysis with Reflex to Culture    Collection Time: 02/17/24  1:43 PM    Specimen: Urine    Urine specimen   Result Value Ref Range    Color, UA DARK YELLOW      Appearance CLEAR CLEAR      Specific Gravity, UA 1.023      pH, Urine 7.0 5.0 - 8.0      Protein, UA 30 (A) NEG mg/dL    Glucose, UA Negative NEG mg/dL    Ketones, Urine TRACE (A) NEG mg/dL    Blood, Urine MODERATE (A) NEG      Urobilinogen, Urine 4.0 (H) 0.2 - 1.0 EU/dL    Nitrite, Urine Negative NEG      Leukocyte Esterase, Urine MODERATE (A) NEG      Urine Culture if Indicated URINE CULTURE ORDERED (A) CNI      WBC, UA 10-20 0 - 4 /hpf    RBC, UA  0 - 5 /hpf    Epithelial Cells UA MANY (A) FEW /lpf    BACTERIA, URINE 3+ (A) NEG /hpf    Hyaline Casts, UA 0-2 0 - 2 /lpf   Bilirubin, Confirmatory    Collection Time: 02/17/24  1:43 PM   Result Value Ref Range    Bilirubin Confirmation, UA Negative

## 2024-02-18 LAB
BACTERIA SPEC CULT: NORMAL
SERVICE CMNT-IMP: NORMAL

## 2024-02-20 LAB
EKG ATRIAL RATE: 80 BPM
EKG DIAGNOSIS: NORMAL
EKG P AXIS: 24 DEGREES
EKG P-R INTERVAL: 184 MS
EKG Q-T INTERVAL: 376 MS
EKG QRS DURATION: 96 MS
EKG QTC CALCULATION (BAZETT): 433 MS
EKG R AXIS: -42 DEGREES
EKG T AXIS: 19 DEGREES
EKG VENTRICULAR RATE: 80 BPM

## 2024-02-23 LAB
BACTERIA SPEC CULT: NORMAL
BACTERIA SPEC CULT: NORMAL
SERVICE CMNT-IMP: NORMAL
SERVICE CMNT-IMP: NORMAL

## 2024-02-27 ENCOUNTER — OFFICE VISIT (OUTPATIENT)
Age: 64
End: 2024-02-27
Payer: COMMERCIAL

## 2024-02-27 VITALS
RESPIRATION RATE: 16 BRPM | OXYGEN SATURATION: 99 % | SYSTOLIC BLOOD PRESSURE: 112 MMHG | HEART RATE: 67 BPM | DIASTOLIC BLOOD PRESSURE: 76 MMHG | TEMPERATURE: 98.2 F | HEIGHT: 63 IN | BODY MASS INDEX: 26.12 KG/M2 | WEIGHT: 147.4 LBS

## 2024-02-27 DIAGNOSIS — Z09 HOSPITAL DISCHARGE FOLLOW-UP: ICD-10-CM

## 2024-02-27 DIAGNOSIS — R74.01 TRANSAMINITIS: ICD-10-CM

## 2024-02-27 DIAGNOSIS — G45.9 TIA (TRANSIENT ISCHEMIC ATTACK): Primary | ICD-10-CM

## 2024-02-27 PROCEDURE — 1090F PRES/ABSN URINE INCON ASSESS: CPT

## 2024-02-27 PROCEDURE — G8427 DOCREV CUR MEDS BY ELIG CLIN: HCPCS

## 2024-02-27 PROCEDURE — G8419 CALC BMI OUT NRM PARAM NOF/U: HCPCS

## 2024-02-27 PROCEDURE — 99215 OFFICE O/P EST HI 40 MIN: CPT

## 2024-02-27 PROCEDURE — 3017F COLORECTAL CA SCREEN DOC REV: CPT

## 2024-02-27 PROCEDURE — 1036F TOBACCO NON-USER: CPT

## 2024-02-27 PROCEDURE — 1123F ACP DISCUSS/DSCN MKR DOCD: CPT

## 2024-02-27 PROCEDURE — G8400 PT W/DXA NO RESULTS DOC: HCPCS

## 2024-02-27 PROCEDURE — G8484 FLU IMMUNIZE NO ADMIN: HCPCS

## 2024-02-27 ASSESSMENT — PATIENT HEALTH QUESTIONNAIRE - PHQ9
SUM OF ALL RESPONSES TO PHQ QUESTIONS 1-9: 0
2. FEELING DOWN, DEPRESSED OR HOPELESS: 0
1. LITTLE INTEREST OR PLEASURE IN DOING THINGS: 0
SUM OF ALL RESPONSES TO PHQ9 QUESTIONS 1 & 2: 0

## 2024-02-27 ASSESSMENT — ENCOUNTER SYMPTOMS
EYES NEGATIVE: 1
GASTROINTESTINAL NEGATIVE: 1
RESPIRATORY NEGATIVE: 1
ALLERGIC/IMMUNOLOGIC NEGATIVE: 1

## 2024-02-27 NOTE — PATIENT INSTRUCTIONS
As per our discussion,    Overall, you are doing really well    I encourage you to continue on aspirin 81 mg and they will be taken for the rest of your life.  Since you are going to see your primary care provider tomorrow, please talk to him about starting you on something for cholesterol.    Continue to work to all of his comorbid conditions as managed by PCP and other specialists as appropriate    RECOMMENDATIONS:  - BP goal is less than 140/90  - Goal HbA1c is less than 7.   - LDL less than 70      I provided stroke education today in regards to risk factors for stroke and lifestyle modifications to help minimize the risk of future stroke.  This included medication compliance, regular follow up with primary care physician,  and healthy lifestyle habits (nutrition/exercise) .    A copy of the Mediterranean diet was provided to you today    It was a pleasure to meet you and your daughter today    Will see you on an as-needed basis    Please do not hesitate to reach out for any questions or concerns.

## 2024-02-27 NOTE — PROGRESS NOTES
arteries.  The anterior communicating artery is patent.    There is no evidence of large vessel occlusion or flow-limiting stenosis of the  intracranial vertebral arteries, basilar artery, or posterior cerebral arteries.  The posterior communicating arteries are diminutive but patent.    There is no evidence of aneurysm or vascular malformation. The dural venous  sinuses and deep cerebral venous system are patent. No evidence of abnormal  enhancement on delayed phase images.    CTA NECK:  NASCET method was utilized for calculating stenosis.    The aortic arch is unremarkable. The common carotid arteries demonstrate no  significant stenosis. There is no evidence of significant stenosis in the  cervical right internal carotid artery. There is no evidence of significant  stenosis in the cervical left internal carotid artery.    There is a codominant vertebrobasilar arterial system. The cervical vertebral  arteries are normal in course, size and contour without significant stenosis.    Visualized soft tissues of the neck are unremarkable. Incidental 9 mm left  thyroid nodule. Visualized lung apices are clear. No acute fracture or  aggressive osseous lesion. Mild degenerative disc disease in the cervical spine.    CT Brain Perfusion:  There are no regional areas of elevated Tmax, decreased cerebral blood flow or  blood volume.  rCBF < 30% = 0 cc.  Tmax > 6 seconds = 0 cc.    Impression  CTA Head:  1. No evidence of significant stenosis or aneurysm.    CTA Neck:  1. No evidence of significant stenosis.    CT Brain Perfusion:  1. No acute abnormality.      CTA HEAD NECK W CONTRAST 01/31/2024    Narrative  EXAM:  CTA CODE NEURO HEAD AND NECK W CONT, CT BRAIN PERFUSION    INDICATION:   Stroke    COMPARISON:  CT head 1/31/2024.    CONTRAST:  100 mL of Isovue-370.    TECHNIQUE:  Unenhanced  images were obtained to localize the volume for  acquisition.  Multislice helical axial CT angiography was performed from the  aortic

## 2024-02-28 NOTE — ASSESSMENT & PLAN NOTE
Patient denied any recurrence of stroke like symptoms.    She is to continue to take aspirin 81 mg daily and she is to discuss with primary care provider on other lipid-lowering agents to help lowering her cholesterol.    She is to continue to follow-up with primary care provider for transaminitis management.    Patient was encouraged to remain active, adopt healthy lifestyles which includes exercise to help reduce future TIA or strokes.    Labs and imaging were reviewed with patient.  She verbalized understanding and all of her questions were answered.    There is no additional workup needed at this time from neurological standpoint.    Patient is to continue to follow-up with her primary care provider as appropriate.

## 2024-02-28 NOTE — ASSESSMENT & PLAN NOTE
Stable    Will defer management to her primary care provider for management.    Patient is to continue to follow-up with PCP and other specialists for the comorbid conditions as appropriate.

## 2024-02-28 NOTE — ASSESSMENT & PLAN NOTE
Stable without recurrence of symptoms     Patient is to continue on aspirin 81 mg daily.    She is to discuss with provider for any other lipid lowering agents to help with high cholesterol given her last LDL was 122 and she developed transaminitis.    Patient is to continue to work to all of his comorbid conditions as managed by PCP and other specialists as appropriate    RECOMMENDATIONS:  - BP goal is less than 140/90  - Goal HbA1c is less than 7.   - LDL less than 70     Stroke education was provided today in regards to risk factors for stroke and lifestyle modifications to help minimize the risk of future stroke.    This included medication compliance, regular follow up with primary care physician,  and healthy lifestyle habits (nutrition/exercise).    A copy of Mediterranean diet was provided to patient.

## 2024-03-28 PROBLEM — Z09 HOSPITAL DISCHARGE FOLLOW-UP: Status: RESOLVED | Noted: 2024-02-27 | Resolved: 2024-03-28

## 2024-05-20 ENCOUNTER — HOSPITAL ENCOUNTER (OUTPATIENT)
Facility: HOSPITAL | Age: 64
Setting detail: OBSERVATION
LOS: 1 days | Discharge: HOME | End: 2024-05-22
Attending: STUDENT IN AN ORGANIZED HEALTH CARE EDUCATION/TRAINING PROGRAM | Admitting: STUDENT IN AN ORGANIZED HEALTH CARE EDUCATION/TRAINING PROGRAM
Payer: COMMERCIAL

## 2024-05-20 ENCOUNTER — APPOINTMENT (OUTPATIENT)
Facility: HOSPITAL | Age: 64
End: 2024-05-20
Payer: COMMERCIAL

## 2024-05-20 DIAGNOSIS — R42 DIZZINESS: Primary | ICD-10-CM

## 2024-05-20 DIAGNOSIS — R26.9 GAIT ABNORMALITY: ICD-10-CM

## 2024-05-20 LAB
ALBUMIN SERPL-MCNC: 3.8 G/DL (ref 3.5–5)
ALBUMIN/GLOB SERPL: 0.9 (ref 1.1–2.2)
ALP SERPL-CCNC: 129 U/L (ref 45–117)
ALT SERPL-CCNC: 45 U/L (ref 12–78)
ANION GAP SERPL CALC-SCNC: 1 MMOL/L (ref 5–15)
APPEARANCE UR: CLEAR
AST SERPL-CCNC: 27 U/L (ref 15–37)
BACTERIA URNS QL MICRO: NEGATIVE /HPF
BASOPHILS # BLD: 0.1 K/UL (ref 0–0.1)
BASOPHILS NFR BLD: 2 % (ref 0–1)
BILIRUB SERPL-MCNC: 0.4 MG/DL (ref 0.2–1)
BILIRUB UR QL: NEGATIVE
BUN SERPL-MCNC: 9 MG/DL (ref 6–20)
BUN/CREAT SERPL: 11 (ref 12–20)
CALCIUM SERPL-MCNC: 9.5 MG/DL (ref 8.5–10.1)
CHLORIDE SERPL-SCNC: 105 MMOL/L (ref 97–108)
CO2 SERPL-SCNC: 32 MMOL/L (ref 21–32)
COLOR UR: ABNORMAL
COMMENT:: NORMAL
CREAT SERPL-MCNC: 0.79 MG/DL (ref 0.55–1.02)
DIFFERENTIAL METHOD BLD: ABNORMAL
EOSINOPHIL # BLD: 0.6 K/UL (ref 0–0.4)
EOSINOPHIL NFR BLD: 8 % (ref 0–7)
EPITH CASTS URNS QL MICRO: ABNORMAL /LPF
ERYTHROCYTE [DISTWIDTH] IN BLOOD BY AUTOMATED COUNT: 13.5 % (ref 11.5–14.5)
GLOBULIN SER CALC-MCNC: 4.2 G/DL (ref 2–4)
GLUCOSE BLD STRIP.AUTO-MCNC: 107 MG/DL (ref 65–117)
GLUCOSE SERPL-MCNC: 134 MG/DL (ref 65–100)
GLUCOSE UR STRIP.AUTO-MCNC: NEGATIVE MG/DL
HCT VFR BLD AUTO: 41 % (ref 35–47)
HGB BLD-MCNC: 13.4 G/DL (ref 11.5–16)
HGB UR QL STRIP: NEGATIVE
HYALINE CASTS URNS QL MICRO: ABNORMAL /LPF (ref 0–2)
IMM GRANULOCYTES # BLD AUTO: 0 K/UL (ref 0–0.04)
IMM GRANULOCYTES NFR BLD AUTO: 0 % (ref 0–0.5)
INR PPP: 1 (ref 0.9–1.1)
KETONES UR QL STRIP.AUTO: NEGATIVE MG/DL
LEUKOCYTE ESTERASE UR QL STRIP.AUTO: NEGATIVE
LYMPHOCYTES # BLD: 3.1 K/UL (ref 0.8–3.5)
LYMPHOCYTES NFR BLD: 44 % (ref 12–49)
MCH RBC QN AUTO: 29.3 PG (ref 26–34)
MCHC RBC AUTO-ENTMCNC: 32.7 G/DL (ref 30–36.5)
MCV RBC AUTO: 89.7 FL (ref 80–99)
MONOCYTES # BLD: 0.6 K/UL (ref 0–1)
MONOCYTES NFR BLD: 8 % (ref 5–13)
NEUTS SEG # BLD: 2.6 K/UL (ref 1.8–8)
NEUTS SEG NFR BLD: 38 % (ref 32–75)
NITRITE UR QL STRIP.AUTO: NEGATIVE
NRBC # BLD: 0 K/UL (ref 0–0.01)
NRBC BLD-RTO: 0 PER 100 WBC
PH UR STRIP: 8.5 (ref 5–8)
PLATELET # BLD AUTO: 335 K/UL (ref 150–400)
PMV BLD AUTO: 9.9 FL (ref 8.9–12.9)
POTASSIUM SERPL-SCNC: 3.7 MMOL/L (ref 3.5–5.1)
PROT SERPL-MCNC: 8 G/DL (ref 6.4–8.2)
PROT UR STRIP-MCNC: NEGATIVE MG/DL
PROTHROMBIN TIME: 10.6 SEC (ref 9–11.1)
RBC # BLD AUTO: 4.57 M/UL (ref 3.8–5.2)
RBC #/AREA URNS HPF: ABNORMAL /HPF (ref 0–5)
SERVICE CMNT-IMP: NORMAL
SODIUM SERPL-SCNC: 138 MMOL/L (ref 136–145)
SP GR UR REFRACTOMETRY: 1.01
SPECIMEN HOLD: NORMAL
TROPONIN I SERPL HS-MCNC: 4 NG/L (ref 0–51)
URINE CULTURE IF INDICATED: ABNORMAL
UROBILINOGEN UR QL STRIP.AUTO: 0.2 EU/DL (ref 0.2–1)
WBC # BLD AUTO: 6.9 K/UL (ref 3.6–11)
WBC URNS QL MICRO: ABNORMAL /HPF (ref 0–4)

## 2024-05-20 PROCEDURE — 1100000003 HC PRIVATE W/ TELEMETRY

## 2024-05-20 PROCEDURE — 84484 ASSAY OF TROPONIN QUANT: CPT

## 2024-05-20 PROCEDURE — 6360000002 HC RX W HCPCS: Performed by: STUDENT IN AN ORGANIZED HEALTH CARE EDUCATION/TRAINING PROGRAM

## 2024-05-20 PROCEDURE — 70450 CT HEAD/BRAIN W/O DYE: CPT

## 2024-05-20 PROCEDURE — 85025 COMPLETE CBC W/AUTO DIFF WBC: CPT

## 2024-05-20 PROCEDURE — 70496 CT ANGIOGRAPHY HEAD: CPT

## 2024-05-20 PROCEDURE — 71045 X-RAY EXAM CHEST 1 VIEW: CPT

## 2024-05-20 PROCEDURE — 85610 PROTHROMBIN TIME: CPT

## 2024-05-20 PROCEDURE — 81001 URINALYSIS AUTO W/SCOPE: CPT

## 2024-05-20 PROCEDURE — 99285 EMERGENCY DEPT VISIT HI MDM: CPT

## 2024-05-20 PROCEDURE — 82962 GLUCOSE BLOOD TEST: CPT

## 2024-05-20 PROCEDURE — 80053 COMPREHEN METABOLIC PANEL: CPT

## 2024-05-20 PROCEDURE — 36415 COLL VENOUS BLD VENIPUNCTURE: CPT

## 2024-05-20 PROCEDURE — 6370000000 HC RX 637 (ALT 250 FOR IP): Performed by: STUDENT IN AN ORGANIZED HEALTH CARE EDUCATION/TRAINING PROGRAM

## 2024-05-20 PROCEDURE — 6360000004 HC RX CONTRAST MEDICATION: Performed by: STUDENT IN AN ORGANIZED HEALTH CARE EDUCATION/TRAINING PROGRAM

## 2024-05-20 PROCEDURE — 93005 ELECTROCARDIOGRAM TRACING: CPT | Performed by: STUDENT IN AN ORGANIZED HEALTH CARE EDUCATION/TRAINING PROGRAM

## 2024-05-20 PROCEDURE — 0042T CT BRAIN PERFUSION: CPT

## 2024-05-20 RX ORDER — ASPIRIN 300 MG/1
300 SUPPOSITORY RECTAL DAILY
Status: DISCONTINUED | OUTPATIENT
Start: 2024-05-20 | End: 2024-05-22 | Stop reason: HOSPADM

## 2024-05-20 RX ORDER — POLYETHYLENE GLYCOL 3350 17 G/17G
17 POWDER, FOR SOLUTION ORAL DAILY PRN
Status: DISCONTINUED | OUTPATIENT
Start: 2024-05-20 | End: 2024-05-22 | Stop reason: HOSPADM

## 2024-05-20 RX ORDER — ONDANSETRON 4 MG/1
4 TABLET, ORALLY DISINTEGRATING ORAL EVERY 8 HOURS PRN
Status: DISCONTINUED | OUTPATIENT
Start: 2024-05-20 | End: 2024-05-22 | Stop reason: HOSPADM

## 2024-05-20 RX ORDER — SODIUM CHLORIDE 9 MG/ML
INJECTION, SOLUTION INTRAVENOUS PRN
Status: DISCONTINUED | OUTPATIENT
Start: 2024-05-20 | End: 2024-05-22 | Stop reason: HOSPADM

## 2024-05-20 RX ORDER — SODIUM CHLORIDE 0.9 % (FLUSH) 0.9 %
5-40 SYRINGE (ML) INJECTION PRN
Status: DISCONTINUED | OUTPATIENT
Start: 2024-05-20 | End: 2024-05-22 | Stop reason: HOSPADM

## 2024-05-20 RX ORDER — ROSUVASTATIN CALCIUM 5 MG/1
5 TABLET, COATED ORAL SEE ADMIN INSTRUCTIONS
Status: ON HOLD | COMMUNITY
End: 2024-05-22 | Stop reason: HOSPADM

## 2024-05-20 RX ORDER — ONDANSETRON 2 MG/ML
4 INJECTION INTRAMUSCULAR; INTRAVENOUS EVERY 6 HOURS PRN
Status: DISCONTINUED | OUTPATIENT
Start: 2024-05-20 | End: 2024-05-22 | Stop reason: HOSPADM

## 2024-05-20 RX ORDER — ASPIRIN 81 MG/1
81 TABLET, CHEWABLE ORAL DAILY
Status: DISCONTINUED | OUTPATIENT
Start: 2024-05-20 | End: 2024-05-22 | Stop reason: HOSPADM

## 2024-05-20 RX ORDER — SODIUM CHLORIDE 0.9 % (FLUSH) 0.9 %
5-40 SYRINGE (ML) INJECTION EVERY 12 HOURS SCHEDULED
Status: DISCONTINUED | OUTPATIENT
Start: 2024-05-20 | End: 2024-05-22 | Stop reason: HOSPADM

## 2024-05-20 RX ORDER — ACETAMINOPHEN 325 MG/1
650 TABLET ORAL EVERY 4 HOURS PRN
Status: DISCONTINUED | OUTPATIENT
Start: 2024-05-20 | End: 2024-05-22 | Stop reason: HOSPADM

## 2024-05-20 RX ORDER — ROSUVASTATIN CALCIUM 40 MG/1
40 TABLET, COATED ORAL NIGHTLY
Status: DISCONTINUED | OUTPATIENT
Start: 2024-05-20 | End: 2024-05-22 | Stop reason: HOSPADM

## 2024-05-20 RX ORDER — LABETALOL HYDROCHLORIDE 5 MG/ML
10 INJECTION INTRAVENOUS EVERY 10 MIN PRN
Status: DISCONTINUED | OUTPATIENT
Start: 2024-05-20 | End: 2024-05-22 | Stop reason: HOSPADM

## 2024-05-20 RX ORDER — ATORVASTATIN CALCIUM 40 MG/1
80 TABLET, FILM COATED ORAL NIGHTLY
Status: DISCONTINUED | OUTPATIENT
Start: 2024-05-20 | End: 2024-05-20

## 2024-05-20 RX ORDER — ENOXAPARIN SODIUM 100 MG/ML
40 INJECTION SUBCUTANEOUS EVERY 24 HOURS
Status: DISCONTINUED | OUTPATIENT
Start: 2024-05-20 | End: 2024-05-22 | Stop reason: HOSPADM

## 2024-05-20 RX ADMIN — ENOXAPARIN SODIUM 40 MG: 100 INJECTION SUBCUTANEOUS at 17:39

## 2024-05-20 RX ADMIN — IOPAMIDOL 100 ML: 755 INJECTION, SOLUTION INTRAVENOUS at 15:02

## 2024-05-20 RX ADMIN — ASPIRIN 81 MG CHEWABLE TABLET 81 MG: 81 TABLET CHEWABLE at 17:39

## 2024-05-20 ASSESSMENT — LIFESTYLE VARIABLES
HOW MANY STANDARD DRINKS CONTAINING ALCOHOL DO YOU HAVE ON A TYPICAL DAY: PATIENT DOES NOT DRINK
HOW OFTEN DO YOU HAVE A DRINK CONTAINING ALCOHOL: NEVER

## 2024-05-20 ASSESSMENT — PAIN SCALES - GENERAL: PAINLEVEL_OUTOF10: 0

## 2024-05-20 NOTE — ED PROVIDER NOTES
[facility] EMERGENCY DEPT  EMERGENCY DEPARTMENT ENCOUNTER       Pt Name: Lucero Olivas  MRN: [id number]  Birthdate 1960  Date of evaluation: 5/20/2024  Provider: La Hi DO   PCP: Jonel Molina MD  Note Started: 6:06 PM 5/20/24     CHIEF COMPLAINT       Chief Complaint   Patient presents with    Gait Problem     Ambulatory to triage reporting \"feeling like my balance is off\" since 1330; reports hx stroke in January        HISTORY OF PRESENT ILLNESS: 1 or more elements      History From: Patient, Patient's relative  None     Lucero Olivas is a 64 y.o. female who presents with chief complaint of acute onsets of gait abnormality around 130 today.  Patient reports history of previous stroke and is on aspirin.  Denies any blood thinners.  She states she also feels dizzy and woozy.  She denies any focal numbness or weakness in her extremities.     Nursing Notes were all reviewed and agreed with or any disagreements were addressed in the HPI.       PAST HISTORY     Past Medical History:  Past Medical History:   Diagnosis Date    Menopause          Past Surgical History:  Past Surgical History:   Procedure Laterality Date    GYN  2004    HYSTERECTOMY (CERVIX STATUS UNKNOWN)      ORTHOPEDIC SURGERY         Family History:  Family History   Problem Relation Age of Onset    Dementia Mother     No Known Problems Father        Social History:  Social History     Tobacco Use    Smoking status: Never     Passive exposure: Never    Smokeless tobacco: Never   Vaping Use    Vaping Use: Never used   Substance Use Topics    Alcohol use: No    Drug use: No       Allergies:  No Known Allergies    CURRENT MEDICATIONS      Previous Medications    ASPIRIN 81 MG CHEWABLE TABLET    Take 1 tablet by mouth daily    DICLOFENAC SODIUM (VOLTAREN) 1 % GEL    Apply 2 g topically 2 times daily for 14 days    NAPROXEN (NAPROSYN) 250 MG TABLET    Take 1-2 tablets by mouth 2 times daily (with meals) for 10 days         PHYSICAL EXAM

## 2024-05-20 NOTE — H&P
Hospitalist Admission Note    NAME:   Lucero Olivas   : 1960   MRN: [id number]     Date/Time: 2024 5:02 PM    Patient PCP: Jonel Molina MD    ______________________________________________________________________  Given the patient's current clinical presentation, I have a high level of concern for decompensation if discharged from the emergency department.  Complex decision making was performed, which includes reviewing the patient's available past medical records, laboratory results, and x-ray films.       My assessment of this patient's clinical condition and my plan of care is as follows.    Assessment / Plan:    Gait instability  Dizziness  Rule out a stroke  CT head without contrast unremarkable  CTA head and neck no large vessels occlusion and CT head brain perfusion unremarkable    Will get MRI  Will consult neurology  Will start empirically on aspirin and statin  Check hemoglobin A1c, lipid panel  Check TSH  Had echo recent admission will hold off now      Slightly elevated alkaline phosphatase  Which is decreased from 3 months ago from medical record  Bilirubin and AST ALT are within normal limit        Medical Decision Making:   I personally reviewed labs: yes  I personally reviewed imaging:yes  I personally reviewed EKG:  Toxic drug monitoring: yes  Discussed case with: ED provider. After discussion I am in agreement that acuity of patient's medical condition necessitates hospital stay.      Code Status: Full code  DVT Prophylaxis: Lovenox  Baseline: Independent    Subjective:   CHIEF COMPLAINT: Dizziness    HISTORY OF PRESENT ILLNESS:     Lucero Olivas is a 64 y.o.  female with PMHx significant for TIA in February presented today to the hospital after she experienced dizziness, that around 1:30 PM, patient was sitting in the chair, and she said she felt her head is spinning, but she denied any double vision nausea or vomiting, patient also reported that she been unbalanced and

## 2024-05-20 NOTE — ED NOTES
This RN gave SBAR report to JOZEF Lorenzo at this time. Offered oncoming RN the opportunity to ask any questions. This RN stated pertinent pt information, additionally informed oncoming RN of tasks that still need to be completed. Pt's bed locked & in lowest position and call light w/in reach. Emergency equipment at bedside.

## 2024-05-20 NOTE — ED NOTES
1433 [place] BERNAL assessing pt at this time in triage for stroke r/o  1438 Level 1 Code Stroke called overhead and Tele-Neuro called at this time; Benito Charge RN notified this RN that this RN will be assuming care of pt   1439 This RN is escorting pt to CT at this time  1441 Pt in CT RM 3; Tele-Neuro Machine, this RN and Kolton BERNAL at bedside  1443 Dry Head CT completed at this time   1448 Sadiq Teleneurologist MD on monitor assessing pt at this time, his NIH: 0; IV access obtained at this time 20G in Left AC  [id number] Kolton BERNAL waiting for Sadiq BERNAL's call   1457 Sadiq BERNAL called Kolton BERNAL at this time, pt escorted to CT RM 1 for Head CTA  1503 Head CTA completed at this time   1511 This RN escorting pt to ED RM 22   1513 Pt in ED RM 22, attached to continuous monitoring x3, friend at bedside, emergency equipment at bedside, bed locked & in low position, x2 side rails up

## 2024-05-21 ENCOUNTER — TELEPHONE (OUTPATIENT)
Facility: HOSPITAL | Age: 64
End: 2024-05-21

## 2024-05-21 ENCOUNTER — APPOINTMENT (OUTPATIENT)
Facility: HOSPITAL | Age: 64
End: 2024-05-21
Payer: COMMERCIAL

## 2024-05-21 PROBLEM — R42 DIZZINESS: Status: ACTIVE | Noted: 2024-05-21

## 2024-05-21 LAB
CHOLEST SERPL-MCNC: 173 MG/DL
ERYTHROCYTE [DISTWIDTH] IN BLOOD BY AUTOMATED COUNT: 13.7 % (ref 11.5–14.5)
EST. AVERAGE GLUCOSE BLD GHB EST-MCNC: 111 MG/DL
HBA1C MFR BLD: 5.5 % (ref 4–5.6)
HCT VFR BLD AUTO: 37.4 % (ref 35–47)
HDLC SERPL-MCNC: 65 MG/DL
HDLC SERPL: 2.7 (ref 0–5)
HGB BLD-MCNC: 12 G/DL (ref 11.5–16)
LDLC SERPL CALC-MCNC: 70.8 MG/DL (ref 0–100)
MCH RBC QN AUTO: 29 PG (ref 26–34)
MCHC RBC AUTO-ENTMCNC: 32.1 G/DL (ref 30–36.5)
MCV RBC AUTO: 90.3 FL (ref 80–99)
NRBC # BLD: 0 K/UL (ref 0–0.01)
NRBC BLD-RTO: 0 PER 100 WBC
PLATELET # BLD AUTO: 309 K/UL (ref 150–400)
PMV BLD AUTO: 9.4 FL (ref 8.9–12.9)
RBC # BLD AUTO: 4.14 M/UL (ref 3.8–5.2)
TRIGL SERPL-MCNC: 186 MG/DL
VLDLC SERPL CALC-MCNC: 37.2 MG/DL
WBC # BLD AUTO: 7.7 K/UL (ref 3.6–11)

## 2024-05-21 PROCEDURE — G0378 HOSPITAL OBSERVATION PER HR: HCPCS

## 2024-05-21 PROCEDURE — 36415 COLL VENOUS BLD VENIPUNCTURE: CPT

## 2024-05-21 PROCEDURE — 99222 1ST HOSP IP/OBS MODERATE 55: CPT | Performed by: INTERNAL MEDICINE

## 2024-05-21 PROCEDURE — 6360000002 HC RX W HCPCS: Performed by: STUDENT IN AN ORGANIZED HEALTH CARE EDUCATION/TRAINING PROGRAM

## 2024-05-21 PROCEDURE — 80061 LIPID PANEL: CPT

## 2024-05-21 PROCEDURE — 85027 COMPLETE CBC AUTOMATED: CPT

## 2024-05-21 PROCEDURE — 6370000000 HC RX 637 (ALT 250 FOR IP): Performed by: NURSE PRACTITIONER

## 2024-05-21 PROCEDURE — 97161 PT EVAL LOW COMPLEX 20 MIN: CPT

## 2024-05-21 PROCEDURE — 97535 SELF CARE MNGMENT TRAINING: CPT

## 2024-05-21 PROCEDURE — 83036 HEMOGLOBIN GLYCOSYLATED A1C: CPT

## 2024-05-21 PROCEDURE — 2580000003 HC RX 258: Performed by: STUDENT IN AN ORGANIZED HEALTH CARE EDUCATION/TRAINING PROGRAM

## 2024-05-21 PROCEDURE — 96372 THER/PROPH/DIAG INJ SC/IM: CPT

## 2024-05-21 PROCEDURE — 97165 OT EVAL LOW COMPLEX 30 MIN: CPT

## 2024-05-21 PROCEDURE — 6370000000 HC RX 637 (ALT 250 FOR IP): Performed by: STUDENT IN AN ORGANIZED HEALTH CARE EDUCATION/TRAINING PROGRAM

## 2024-05-21 PROCEDURE — 6370000000 HC RX 637 (ALT 250 FOR IP): Performed by: INTERNAL MEDICINE

## 2024-05-21 PROCEDURE — 70551 MRI BRAIN STEM W/O DYE: CPT

## 2024-05-21 RX ORDER — CLOPIDOGREL BISULFATE 75 MG/1
75 TABLET ORAL DAILY
Status: DISCONTINUED | OUTPATIENT
Start: 2024-05-21 | End: 2024-05-22 | Stop reason: HOSPADM

## 2024-05-21 RX ADMIN — SODIUM CHLORIDE, PRESERVATIVE FREE 10 ML: 5 INJECTION INTRAVENOUS at 20:23

## 2024-05-21 RX ADMIN — ENOXAPARIN SODIUM 40 MG: 100 INJECTION SUBCUTANEOUS at 17:15

## 2024-05-21 RX ADMIN — CLOPIDOGREL BISULFATE 75 MG: 75 TABLET ORAL at 17:15

## 2024-05-21 RX ADMIN — SODIUM CHLORIDE, PRESERVATIVE FREE 10 ML: 5 INJECTION INTRAVENOUS at 09:29

## 2024-05-21 RX ADMIN — SODIUM CHLORIDE, PRESERVATIVE FREE 10 ML: 5 INJECTION INTRAVENOUS at 20:24

## 2024-05-21 RX ADMIN — ASPIRIN 81 MG CHEWABLE TABLET 81 MG: 81 TABLET CHEWABLE at 09:28

## 2024-05-21 RX ADMIN — ROSUVASTATIN CALCIUM 40 MG: 40 TABLET, FILM COATED ORAL at 20:23

## 2024-05-21 ASSESSMENT — PAIN SCALES - GENERAL: PAINLEVEL_OUTOF10: 0

## 2024-05-21 NOTE — CONSULTS
Date of Consultation:  May 21, 2024    Referring Physician: MD Lindsay     Reason for Consultation:  dizziness    Chief Complaint   Patient presents with    Gait Problem     Ambulatory to triage reporting \"feeling like my balance is off\" since 1330; reports hx stroke in January       History of Present Illness:   Lucero Olivas is a 64 y.o. female with history of TIA who presents with acute episode of dizziness.     Patient was recently seen in February for dizziness and left-sided paresthesias for which neurology evaluated her and it was presumed to be a TIA.  She had a brain MRI at that time which was unremarkable.  She did have minimal chronic microvascular ischemic changes.  Patient states that yesterday she was watching TV and felt sudden onset of dizziness and lightheadedness in her head.  She did not try to stand up or get up to determine if this was a balance issue but did feel that if she were to get up she would fall.  She denies any room spinning sensation, nausea, vomiting.  Denies any chest pain or palpitations, diaphoresis.  Denies any headache, vision change, speech or language dysfunction, focal weakness or numbness.    She states that this lasted for several hours and resolved by the time she was in the ER.    She feels that this may have developed into the event that she had back in February.    She does take aspirin 81 mg daily and has not missed any doses.    Past Medical History:   Diagnosis Date    Menopause         Past Surgical History:   Procedure Laterality Date    GYN  2004    HYSTERECTOMY (CERVIX STATUS UNKNOWN)      ORTHOPEDIC SURGERY          Family History   Problem Relation Age of Onset    Dementia Mother     No Known Problems Father         Social History     Tobacco Use    Smoking status: Never     Passive exposure: Never    Smokeless tobacco: Never   Substance Use Topics    Alcohol use: No        Allergies   Allergen Reactions    Lipitor [Atorvastatin] Hives        Prior to

## 2024-05-21 NOTE — PLAN OF CARE
Problem: Neurosensory - Adult  Goal: Achieves stable or improved neurological status  Outcome: Progressing  Goal: Achieves maximal functionality and self care  Outcome: Progressing     Problem: Musculoskeletal - Adult  Goal: Return mobility to safest level of function  Outcome: Progressing  Goal: Return ADL status to a safe level of function  Outcome: Progressing     Problem: Infection - Adult  Goal: Absence of infection during hospitalization  Outcome: Progressing     Problem: Safety - Adult  Goal: Free from fall injury  Outcome: Progressing     Problem: Discharge Planning  Goal: Discharge to home or other facility with appropriate resources  Outcome: Progressing     Problem: Pain  Goal: Verbalizes/displays adequate comfort level or baseline comfort level  Outcome: Progressing

## 2024-05-22 ENCOUNTER — TELEPHONE (OUTPATIENT)
Facility: HOSPITAL | Age: 64
End: 2024-05-22

## 2024-05-22 VITALS
SYSTOLIC BLOOD PRESSURE: 103 MMHG | RESPIRATION RATE: 18 BRPM | WEIGHT: 154.32 LBS | BODY MASS INDEX: 27.34 KG/M2 | HEART RATE: 61 BPM | DIASTOLIC BLOOD PRESSURE: 60 MMHG | OXYGEN SATURATION: 100 % | TEMPERATURE: 98.2 F | HEIGHT: 63 IN

## 2024-05-22 LAB
EKG ATRIAL RATE: 57 BPM
EKG DIAGNOSIS: NORMAL
EKG P AXIS: 81 DEGREES
EKG P-R INTERVAL: 206 MS
EKG Q-T INTERVAL: 418 MS
EKG QRS DURATION: 90 MS
EKG QTC CALCULATION (BAZETT): 406 MS
EKG R AXIS: -32 DEGREES
EKG T AXIS: 2 DEGREES
EKG VENTRICULAR RATE: 57 BPM

## 2024-05-22 PROCEDURE — 6370000000 HC RX 637 (ALT 250 FOR IP): Performed by: STUDENT IN AN ORGANIZED HEALTH CARE EDUCATION/TRAINING PROGRAM

## 2024-05-22 PROCEDURE — 99233 SBSQ HOSP IP/OBS HIGH 50: CPT

## 2024-05-22 PROCEDURE — G0378 HOSPITAL OBSERVATION PER HR: HCPCS

## 2024-05-22 PROCEDURE — 2580000003 HC RX 258: Performed by: STUDENT IN AN ORGANIZED HEALTH CARE EDUCATION/TRAINING PROGRAM

## 2024-05-22 PROCEDURE — 6370000000 HC RX 637 (ALT 250 FOR IP): Performed by: INTERNAL MEDICINE

## 2024-05-22 RX ORDER — CLOPIDOGREL BISULFATE 75 MG/1
75 TABLET ORAL DAILY
Qty: 19 TABLET | Refills: 0 | Status: SHIPPED | OUTPATIENT
Start: 2024-05-23 | End: 2024-06-11

## 2024-05-22 RX ORDER — ASPIRIN 81 MG/1
81 TABLET, CHEWABLE ORAL DAILY
Qty: 30 TABLET | Refills: 0 | Status: SHIPPED | OUTPATIENT
Start: 2024-05-23

## 2024-05-22 RX ORDER — ROSUVASTATIN CALCIUM 40 MG/1
40 TABLET, COATED ORAL NIGHTLY
Qty: 30 TABLET | Refills: 0 | Status: SHIPPED | OUTPATIENT
Start: 2024-05-22

## 2024-05-22 RX ADMIN — SODIUM CHLORIDE, PRESERVATIVE FREE 10 ML: 5 INJECTION INTRAVENOUS at 09:40

## 2024-05-22 RX ADMIN — CLOPIDOGREL BISULFATE 75 MG: 75 TABLET ORAL at 09:39

## 2024-05-22 RX ADMIN — ASPIRIN 81 MG CHEWABLE TABLET 81 MG: 81 TABLET CHEWABLE at 09:39

## 2024-05-22 NOTE — CARE COORDINATION
Pt clear from CM standpoint.    Transition of Care Plan:    RUR: 5% \"low risk\"  Prior Level of Functioning: Independent with ADL's   Disposition: Home with family   Follow up appointments: PCP office to call pt to schedule appt  DME needed: None  Transportation at discharge: Pt family at bedside to transport   IM/IMM Medicare/ letter given: N/A  Is patient a  and connected with VA? No  Caregiver Contact: Agnieszka Hernandez (daughter), [phone]  Discharge Caregiver contacted prior to discharge? Yes, pt to contact  Care Conference needed? No  Barriers to discharge: None     1049 AM: Chart reviewed. CM acknowledged d/c order. CM met with IDR team to discuss pt d/c plan. Pt to return home with family; pt family to transport pt home. PCP office will call pt directly to schedule PCP appt. CM needs complete at this time.     05/22/24 1051   Services At/After Discharge   Transition of Care Consult (CM Consult) Discharge Planning   Services At/After Discharge None   [place] Resource Information Provided? No   Mode of Transport at Discharge Other (see comment)  (Pt family at bedside to [facility].)   Hospital Transport Time of Discharge 1200   Confirm Follow Up Transport Family   Condition of Participation: Discharge Planning   The Plan for Transition of Care is related to the following treatment goals: Return home independently   The Patient and/or Patient Representative was provided with a Choice of Provider? Patient   The Patient and/Or Patient Representative agree with the Discharge Plan? Yes     CANDICE Pope  Care Management  [facility]   
Independent   Homemaking Assistance Independent   Ambulation Assistance Independent   Transfer Assistance Independent   Active  Yes   Mode of Transportation Car   Occupation Full time employment   Discharge Planning   Type of Residence House   Living Arrangements Alone   Current Services Prior To Admission None   Potential Assistance Needed N/A   DME Ordered? No   Potential Assistance Purchasing Medications No   Type of Home Care Services None   Patient expects to be discharged to: House     Advance Care Planning     General Advance Care Planning (ACP) Conversation    Date of Conversation: 5/21/2024  Conducted with: Patient with Decision Making Capacity  Other persons present: Daughter Agnieszka Hernandez.    Healthcare Decision Maker: No healthcare decision makers have been documented.  Click here to complete HealthCare Decision Makers including selection of the Healthcare Decision Maker Relationship (ie \"Primary\")   Today we documented Decision Maker(s) consistent with Legal Next of Kin hierarchy.    Content/Action Overview:  Has NO ACP documents-Information provided  Reviewed DNR/DNI and patient elects Full Code (Attempt Resuscitation)    Length of Voluntary ACP Conversation in minutes:  <16 minutes (Non-Billable)    SARITHA ESTEBAN

## 2024-05-22 NOTE — TELEPHONE ENCOUNTER
Hello,    Can this patient be scheduled for hospital follow-up in 4 to 8 weeks with any other neurology providers?    Thanks,  Rubi

## 2024-05-22 NOTE — DISCHARGE SUMMARY
CONTINUE taking these medications      aspirin 81 MG chewable tablet  Take 1 tablet by mouth daily  Start taking on: May 23, 2024            STOP taking these medications      naproxen 250 MG tablet  Commonly known as: NAPROSYN               Where to Get Your Medications        These medications were sent to [id number] - [place], [place] - [address] - P [phone] - F [phone] [address] [address]      Phone: [phone]   aspirin 81 MG chewable tablet  clopidogrel 75 MG tablet  rosuvastatin 40 MG tablet             DISPOSITION:    Home with Family:    x   Home with HH/PT/OT/RN:    SNF/LTC:    [facility]:    OTHER:            Code status: Full code  Recommended diet: cardiac diet  Recommended activity: activity as tolerated  Wound care: None      Follow up with:   PCP : Jonel Molina MD Petrizzi, Michael J, MD  [address] [facility] [facility] [address] [phone]    Follow up in 1 week(s)      Jonel Molina MD  [address] [facility] [address] [phone]          Trish Alanis DO  [address]  [address]  [facility] [address] [phone]    Follow up in 6 week(s)            Total time in minutes spent coordinating this discharge (includes going over instructions, follow-up, prescriptions, and preparing report for sign off to her PCP) :  35 minutes

## 2024-05-22 NOTE — PROGRESS NOTES
Hospitalist Progress Note    NAME:   Lucero Olivas   : 1960   MRN: [id number]     Date/Time: 2024 11:14 AM  Patient PCP: Jonel Molina MD    Estimated discharge date:   Barriers: MRI, Neurology clearance      Assessment / Plan:  Gait instability  Dizziness  Rule out CVA/ TIA    CT head without contrast unremarkable  CTA head and neck no large vessels occlusion and CT head brain perfusion unremarkable     MRI pending  Neurology following  Continue aspirin and statin  LDL 70.8, Hba1c: 5.5  TSH pending  Will need echo if MRI positive, [facility] 2024 was unremarkable  PT/OT evaluation  Event monitor ordered        Slightly elevated alkaline phosphatase  Which is decreased from 3 months ago from medical record  Bilirubin and AST ALT are within normal limit     Medical Decision Making:   I personally reviewed labs: yes  I personally reviewed imaging:yes  I personally reviewed EKG:  Toxic drug monitoring: yes  Discussed case with: RN, CM during IDR        Code Status: Full code  DVT Prophylaxis: Lovenox  Baseline: Independent    Subjective:     Chief Complaint / Reason for Physician Visit  Seen and evaluated at bedside for Gait instability  Worked with physical therapy  No pain, no new complains      Objective:     VITALS:   Last 24hrs VS reviewed since prior progress note. Most recent are:  Patient Vitals for the past 24 hrs:   BP Temp Temp src Pulse Resp SpO2 Height Weight   24 1045 125/71 97.7 °F (36.5 °C) -- 54 18 99 % -- --   24 0753 (!) 123/57 97.3 °F (36.3 °C) Oral 67 18 94 % -- --   24 0240 119/63 97.5 °F (36.4 °C) -- 57 16 100 % -- --   246 136/62 97.7 °F (36.5 °C) -- 59 16 100 % -- --   24 (!) 119/59 98.1 °F (36.7 °C) Oral 58 18 99 % -- --   24 1758 121/64 -- -- 65 18 99 % -- --   24 1745 113/66 -- -- 66 20 100 % -- --   24 1728 135/72 -- -- 66 17 100 % -- --   24 1718 126/62 -- -- 59 18 100 % -- --   24 1713 (!) 
11:58 AM  patient already discharged and supposed to get event monitor outpatient per Dr. Mojica's note.Order for event monitor cancelled.  
5/22/2024        RE: Lucero Olivas         [address] [facility] [id number]          To Whom It May Concern,      Due to medical reasons, Lucero Olivas may  may return to work on 5/27/2024.        Sincerely,          Elo Heller MD    
Attempted to schedule hospital follow up PCP appointment. Office  will contact the patient with appointment information per office protocol. [facility] placed [facility] information AVS for patient resource. Pending patient discharge. Bernice Amaya, Care Management Assistant  
Called for event monitor for dizziness. Will mail from office. Thanks, please call with questions.   
End of Shift Note    Bedside shift change report given to  Maura  (oncoming nurse) by GER SMITH, RN .        Shift worked:  7p-7a   Shift summary and any significant changes:     New admit       Concerns for physician to address:  New admit   Zone phone for oncoming shift:   [phone]     Patient Information  Lucero Olivas  64 y.o.  5/20/2024  2:38 PM by Marii Hoyt MD. Lucero Olivas was admitted from [facility]    Problem List  Patient Active Problem List    Diagnosis Date Noted    Gait abnormality 05/20/2024    Transaminitis 02/27/2024    TIA (transient ischemic attack) 02/01/2024    Weakness of left arm 02/01/2024    Vertigo 01/31/2024    Neuropathic pain of both legs 09/05/2017     Past Medical History:   Diagnosis Date    Menopause        Core Measures:  CVA: yes      Activity:  Level of Assistance: Independent after set-up    Cardiac:   Cardiac Monitoring: yes,     Access:   Current line(s): PIV    Respiratory:    RA    GI:     Current diet:  ADULT DIET; Regular  Tolerating current diet: Yes    Pain Management:   Patient states pain is manageable on current regimen: N/A    Skin:  Archie Scale Score: 21  Interventions: N/A  Pressure injury: no    Patient Safety:  Fall Score: Kimbrough Total Score: 0  Interventions: bed alarm    DVT prophylaxis:  DVT prophylaxis: meds    Active Consults:  IP CONSULT TO TELE-NEUROLOGY  IP CONSULT TO NEUROLOGY  IP CONSULT TO HOSPITALIST    Length of Stay:  Expected LOS: 3  Actual LOS: 1    GER SMITH, RN                              
MRI ON HOLD - TELEMETRY ORDERS AND SCREENING SHEET    Please complete screening and sign electronically or fax to [phone].    Telemetry order must be changed to allow the patient to leave the unit without telemetry.    Telemetry box cannot come to MRI with the patient.    Please call MRI at [phone] when this has been done.    If this patient needs monitored while off the unit, please call MRI at [phone] to coordinate a time for an RN to accompany the patient to MRI.  
Nursing contacted Nocturnist/cross cover provider via non-urgent messaging system [employer] and notified patient reported pt states she is allergic to lipitor has hives- I added as allergy, states takes crestor intermittently at home. No other concerns reported. No acute distress reported. No other information provided by nurse. VSS. Discontinued the hld agent and placed on crestor 40mg as pt reported she tolerated this at home ok.. Will defer further evaluation/management to the day shift primary attending care team. Patient denies any further complaints or concerns. Nursing to notify Hospitalist for further/continued concerns. Will remain available overnight for further concerns if nursing/patient needs. Please note, there are RRT systems in this hospital in place that if nursing has acute or critical patient condition change or concern, this is to help facilitate and notify that patient needs immediate bedside evaluation by a provider.     Non-billable note.       
Patient discharged home with all belongings. LDAs removed. Discharge education and follow up information reviewed with patient. Patient's grandson provided transportation.   
Speech Pathology Note    Reviewed chart and note patient admitted with dizziness and loss of balance with concern for CVA. Note CT showed \"No acute intracranial process\" and MRI pending. Note patient passed the [place] Swallow Screen and a regular diet was ordered. NIHSS=0. Discussed case with RN who reported no SLP-related concerns. Introduced self and role of SLP to patient. Patient denied any decline in motor speech, language, cognitive, or swallowing function, and patient informally observed drinking thin liquids with no difficulty. Patient Ox4. Formal SLP evaluation not clinically indicated at this time. Will sign off. Please re-consult if further needs arise. Thank you.    Tremaine Ballard M.S., CCC-SLP  
Spiritual Care Partner Volunteer visited patient at [facility] in [facility] 1 NEUROSCIENCE TELEMETRY on 5/22/2024   Documented by:    BRI Cabrera  [facility]   Paging Service [phone])  
nd of Shift Note     Bedside shift change report given to  , RN (oncoming nurse) by Steffany Lewis RN .          Shift worked: nights   Shift summary and any significant changes:     Cardiology consult called   Patient awaiting Cardiac event monitor      Concerns for physician to address:     Zone phone for oncoming shift:   [phone]      Patient Information  Lucero Olivas  64 y.o.  5/20/2024  2:38 PM by Marii Hoyt MD. Lucero Olivas was admitted from [facility]     Problem List       Patient Active Problem List     Diagnosis Date Noted    Gait abnormality 05/20/2024    Transaminitis 02/27/2024    TIA (transient ischemic attack) 02/01/2024    Weakness of left arm 02/01/2024    Vertigo 01/31/2024    Neuropathic pain of both legs 09/05/2017      Past Medical History        Past Medical History:   Diagnosis Date    Menopause              Core Measures:  CVA: yes        Activity:  Level of Assistance: Independent after set-up     Cardiac:   Cardiac Monitoring: yes,      Access:   Current line(s): PIV     Respiratory:    RA     GI:  Current diet:  ADULT DIET; Regular  Tolerating current diet: Yes     Pain Management:   Patient states pain is manageable on current regimen: N/A     Skin:  Archie Scale Score: 21  Interventions: N/A  Pressure injury: no    Patient Safety:  Fall Score: Kimbrough Total Score: 0  Interventions: bed alarm     DVT prophylaxis:  DVT prophylaxis: meds     Active Consults:  IP CONSULT TO TELE-NEUROLOGY  IP CONSULT TO NEUROLOGY  IP CONSULT TO HOSPITALIST     Length of Stay:  Expected LOS: 3  Actual LOS: 1     
nd of Shift Note     Bedside shift change report given to  JOZEF Jeter (oncoming nurse) by Maura Barton RN .          Shift worked: days   Shift summary and any significant changes:     MRI completed  Neurology came to bedside  Cardiology consult called   Patient awaiting Cardiac event monitor   PT/OT worked with patient    Concerns for physician to address:  New admit   Zone phone for oncoming shift:   [phone]      Patient Information  Lucero Olivas  64 y.o.  5/20/2024  2:38 PM by Marii Hoyt MD. Lucero Olivas was admitted from [facility]     Problem List       Patient Active Problem List     Diagnosis Date Noted    Gait abnormality 05/20/2024    Transaminitis 02/27/2024    TIA (transient ischemic attack) 02/01/2024    Weakness of left arm 02/01/2024    Vertigo 01/31/2024    Neuropathic pain of both legs 09/05/2017      Past Medical History        Past Medical History:   Diagnosis Date    Menopause              Core Measures:  CVA: yes        Activity:  Level of Assistance: Independent after set-up     Cardiac:   Cardiac Monitoring: yes,      Access:   Current line(s): PIV     Respiratory:    RA     GI:  Current diet:  ADULT DIET; Regular  Tolerating current diet: Yes     Pain Management:   Patient states pain is manageable on current regimen: N/A     Skin:  Archie Scale Score: 21  Interventions: N/A  Pressure injury: no    Patient Safety:  Fall Score: Kimbrough Total Score: 0  Interventions: bed alarm     DVT prophylaxis:  DVT prophylaxis: meds     Active Consults:  IP CONSULT TO TELE-NEUROLOGY  IP CONSULT TO NEUROLOGY  IP CONSULT TO HOSPITALIST     Length of Stay:  Expected LOS: 3  Actual LOS: 1     
distress in bed, Call bell within reach, Bed/ chair alarm activated, Caregiver / family present, and Updated patient's board on functional status and mobility recommendations      COMMUNICATION/EDUCATION:   The patient's plan of care was discussed with: registered nurse    Patient Education  Education Given To: Patient;Family  Education Provided: Role of Therapy;Plan of Care;Fall Prevention Strategies  Education Provided Comments: JONHFAST  Education Method: Verbal  Barriers to Learning: None  Education Outcome: Verbalized understanding;Demonstrated understanding    Thank you for this referral.  JENNIFER WARD, PT  Minutes: 13      Physical Therapy Evaluation Charge Determination   History Examination Presentation Decision-Making   LOW Complexity : Zero comorbidities / personal factors that will impact the outcome / POC LOW Complexity : 1-2 Standardized tests and measures addressing body structure, function, activity limitation and / or participation in recreation  LOW Complexity : Stable, uncomplicated   Balance Test  LOW      Based on the above components, the patient evaluation is determined to be of the following complexity level: Low    
Strategies  Education Method: Verbal  Barriers to Learning: None  Education Outcome: Verbalized understanding    Thank you for this referral.  Shira Kothari OT  Minutes: 20    Occupational Therapy Evaluation Charge Determination   History Examination Decision-Making   LOW Complexity : Brief history review  LOW Complexity: 1-3 Performance deficits relating to physical, cognitive, or psychosocial skills that result in activity limitations and/or participation restrictions LOW Complexity: No comorbidities that affect functional and  no verbal  or physical assist needed to complete eval tasks   Based on the above components, the patient evaluation is determined to be of the following complexity level: Low    
    Stroke workup    Head CT without contrast completed on 5/20/2024: No acute intracranial process.  There is no intracranial mass or hemorrhage.    CTA head and neck with contrast completed on 5/20/2024: No acute large vessel occlusion, arterial dissection, or hemodynamically significant stenosis.    CT brain perfusion completed on 5/20/2024 : No perfusion abnormality.    MRI Brain without contrast completed on 5/21/2024: Chronic vascular ischemic disease with no acute infarction.  Few scattered small T2 hyperintensities in the supratentorial white matter.      Stroke labs:  HgBA1c    Hemoglobin A1C   Date Value Ref Range Status   05/21/2024 5.5 4.0 - 5.6 % Final     Comment:     (NOTE)  HbA1C Interpretive Ranges  <5.7              Normal  5.7 - 6.4         Consider Prediabetes  >6.5              Consider Diabetes          LDL   Lab Results   Component Value Date    CHOL 173 05/21/2024    TRIG 186 (H) 05/21/2024    HDL 65 05/21/2024    LDL 70.8 05/21/2024    VLDL 37.2 05/21/2024    CHOLHDLRATIO 2.7 05/21/2024          IMPRESSION:  Lucero Olivas is a 64 y.o. female with history of TIA who presents with acute episode of dizziness without any other lateralizing symptoms.  Symptoms lasted for several hours and resolved.  Her current neurological examination revealed no focal sensory or motor deficits.    - Head CT without contrast with no acute intracranial process.  There is no intracranial mass or hemorrhage.    - CTA head and neck with contrast showed no acute large vessel occlusion, arterial dissection, or hemodynamically significant stenosis.    - CT brain perfusion with no perfusion abnormality.    - MRI Brain without contrast revealed chronic vascular ischemic disease with no acute infarction.  Few scattered small T2 hyperintensities in the supratentorial white matter.      RECOMMENDATIONS:  Acute onset dizziness, resolved. No lateralizing symptoms. May have been a TIA. Has had history of TIA in the past.

## 2024-05-23 ENCOUNTER — TELEPHONE (OUTPATIENT)
Age: 64
End: 2024-05-23

## 2024-05-23 NOTE — TELEPHONE ENCOUNTER
Called and spoke with pt, trying to schedule a 6-8 week hosp f/u. Pt stated that she will have to call us back, she had to call and speak to her daughter.

## 2024-07-14 NOTE — PROGRESS NOTES
[facility]  In Office FOLLOW-UP VISIT         Lucero Olivas is a 64 y.o. female who presents today for the following:  Chief Complaint   Patient presents with    Transient Ischemic Attack     Hospital  follow up- pt denies any symptoms         ASSESSMENT AND PLAN  Patient is known to this practice.  Chart and history reviewed in detail at today's office visit.    1. TIA (transient ischemic attack)  Assessment & Plan:  Stable without recurrence of TIA symptoms     Patient is to continue on aspirin 81 mg daily and rosuvastatin 40 mg daily.    Patient is to continue to work to all of her comorbid conditions as managed by PCP and other specialists as appropriate    Cardiac event monitor study completed but has not read by cardiology.    RECOMMENDATIONS:  - BP goal is less than 140/90  - Goal HbA1c is less than 7.   - LDL less than 70     TIA/stroke education was provided today in regards to risk factors for stroke and lifestyle modifications to help minimize the risk of future stroke.    This included medication compliance, regular follow up with primary care physician,  and healthy lifestyle habits (nutrition/exercise).    A copy of Mediterranean diet was provided to patient.    2. Sleepiness  Assessment & Plan:  Patient verbalized she has been more sleepy since the TIA.    Will defer this to her primary care provider. She has an appointment coming soon.    She is to discuss her symptoms with her PCP.  She may need to check vitamin B12, D, folate, iron level to rule out any deficiencies.    3. Hospital discharge follow-up      Patient and/or family was given time to ask questions and voice concerns. I believe all questions concerns were adequately addressed at this  office visit.  Patient and/or family also verbalized agreement and understanding of the above-stated plan    Complex neurologic decision making secondary any or all of the following to include unclear etiology, and /or

## 2024-07-15 ENCOUNTER — OFFICE VISIT (OUTPATIENT)
Age: 64
End: 2024-07-15
Payer: MEDICARE

## 2024-07-15 VITALS
HEIGHT: 63 IN | HEART RATE: 60 BPM | BODY MASS INDEX: 27.11 KG/M2 | OXYGEN SATURATION: 100 % | DIASTOLIC BLOOD PRESSURE: 72 MMHG | WEIGHT: 153 LBS | SYSTOLIC BLOOD PRESSURE: 120 MMHG | RESPIRATION RATE: 15 BRPM

## 2024-07-15 DIAGNOSIS — Z09 HOSPITAL DISCHARGE FOLLOW-UP: ICD-10-CM

## 2024-07-15 DIAGNOSIS — R40.0 SLEEPINESS: ICD-10-CM

## 2024-07-15 DIAGNOSIS — G45.9 TIA (TRANSIENT ISCHEMIC ATTACK): Primary | ICD-10-CM

## 2024-07-15 PROCEDURE — G8419 CALC BMI OUT NRM PARAM NOF/U: HCPCS

## 2024-07-15 PROCEDURE — 1036F TOBACCO NON-USER: CPT

## 2024-07-15 PROCEDURE — 1123F ACP DISCUSS/DSCN MKR DOCD: CPT

## 2024-07-15 PROCEDURE — 3017F COLORECTAL CA SCREEN DOC REV: CPT

## 2024-07-15 PROCEDURE — G8400 PT W/DXA NO RESULTS DOC: HCPCS

## 2024-07-15 PROCEDURE — G8427 DOCREV CUR MEDS BY ELIG CLIN: HCPCS

## 2024-07-15 PROCEDURE — 1090F PRES/ABSN URINE INCON ASSESS: CPT

## 2024-07-15 PROCEDURE — 99215 OFFICE O/P EST HI 40 MIN: CPT

## 2024-07-15 ASSESSMENT — PATIENT HEALTH QUESTIONNAIRE - PHQ9
1. LITTLE INTEREST OR PLEASURE IN DOING THINGS: NOT AT ALL
SUM OF ALL RESPONSES TO PHQ9 QUESTIONS 1 & 2: 0
SUM OF ALL RESPONSES TO PHQ QUESTIONS 1-9: 0
2. FEELING DOWN, DEPRESSED OR HOPELESS: NOT AT ALL
SUM OF ALL RESPONSES TO PHQ QUESTIONS 1-9: 0

## 2024-07-15 ASSESSMENT — ENCOUNTER SYMPTOMS
ALLERGIC/IMMUNOLOGIC NEGATIVE: 1
EYES NEGATIVE: 1
GASTROINTESTINAL NEGATIVE: 1

## 2024-07-15 NOTE — ASSESSMENT & PLAN NOTE
Stable without recurrence of TIA symptoms     Patient is to continue on aspirin 81 mg daily and rosuvastatin 40 mg daily.    Patient is to continue to work to all of her comorbid conditions as managed by PCP and other specialists as appropriate    Cardiac event monitor study completed but has not read by cardiology.    RECOMMENDATIONS:  - BP goal is less than 140/90  - Goal HbA1c is less than 7.   - LDL less than 70     TIA/stroke education was provided today in regards to risk factors for stroke and lifestyle modifications to help minimize the risk of future stroke.    This included medication compliance, regular follow up with primary care physician,  and healthy lifestyle habits (nutrition/exercise).    A copy of Mediterranean diet was provided to patient.

## 2024-07-15 NOTE — PROGRESS NOTES
1. Have you been to the ER, urgent care clinic since your last visit?  Hospitalized since your last visit?  Seen on 5/20/24    2. Have you seen or consulted any other health care providers outside of the [facility] since your last visit?  Include any pap smears or colon screening.   No.    Chief Complaint   Patient presents with    Transient Ischemic Attack     Hospital  follow up- pt denies any symptoms

## 2024-07-15 NOTE — ASSESSMENT & PLAN NOTE
Patient verbalized she has been more sleepy since the TIA.    Will defer this to her primary care provider. She has an appointment coming soon.    She is to discuss her symptoms with her PCP.  She may need to check vitamin B12, D, folate, iron level to rule out any deficiencies.

## 2024-07-15 NOTE — PATIENT INSTRUCTIONS
As per our discussion,    Overall, you seem stable without recurrence of TIA symptoms    Please continue on aspirin 81 mg and rosuvastatin 40 mg daily.    Continue to work to all of your comorbid conditions as managed by PCP and other specialists as appropriate    RECOMMENDATIONS:  - BP goal is less than 140/90  - Goal HbA1c is less than 7.   - LDL less than 70      I provided stroke education today in regards to risk factors for stroke and lifestyle modifications to help minimize the risk of future stroke.  This included medication compliance, regular follow up with primary care physician,  and healthy lifestyle habits (nutrition/exercise) .  A copy of the Mediterranean diet was provided to you today.    It was a pleasure to see you and meet your granddaughter in law today    Will see you back in 6 months or sooner if needed.     Please do not hesitate to reach out for any questions or concerns.

## 2024-07-30 ENCOUNTER — TRANSCRIBE ORDERS (OUTPATIENT)
Facility: HOSPITAL | Age: 64
End: 2024-07-30

## 2024-07-30 DIAGNOSIS — Z12.31 SCREENING MAMMOGRAM FOR HIGH-RISK PATIENT: Primary | ICD-10-CM

## 2024-08-14 PROBLEM — Z09 HOSPITAL DISCHARGE FOLLOW-UP: Status: RESOLVED | Noted: 2024-07-15 | Resolved: 2024-08-14

## 2024-08-28 ENCOUNTER — HOSPITAL ENCOUNTER (OUTPATIENT)
Facility: HOSPITAL | Age: 64
Discharge: HOME | End: 2024-08-31
Attending: FAMILY MEDICINE
Payer: MEDICARE

## 2024-08-28 VITALS — HEIGHT: 63 IN | BODY MASS INDEX: 27.11 KG/M2 | WEIGHT: 153 LBS

## 2024-08-28 DIAGNOSIS — Z12.31 SCREENING MAMMOGRAM FOR HIGH-RISK PATIENT: ICD-10-CM

## 2024-08-28 PROCEDURE — 77063 BREAST TOMOSYNTHESIS BI: CPT

## 2024-12-03 ENCOUNTER — CLINICAL DOCUMENTATION (OUTPATIENT)
Age: 64
End: 2024-12-03

## 2025-02-13 ASSESSMENT — ENCOUNTER SYMPTOMS
EYES NEGATIVE: 1
ALLERGIC/IMMUNOLOGIC NEGATIVE: 1
GASTROINTESTINAL NEGATIVE: 1

## 2025-02-14 ENCOUNTER — OFFICE VISIT (OUTPATIENT)
Age: 65
End: 2025-02-14
Payer: MEDICARE

## 2025-02-14 VITALS
HEART RATE: 71 BPM | OXYGEN SATURATION: 99 % | HEIGHT: 63 IN | WEIGHT: 155 LBS | SYSTOLIC BLOOD PRESSURE: 118 MMHG | RESPIRATION RATE: 14 BRPM | DIASTOLIC BLOOD PRESSURE: 74 MMHG | BODY MASS INDEX: 27.46 KG/M2

## 2025-02-14 DIAGNOSIS — Z86.73 HISTORY OF TIA (TRANSIENT ISCHEMIC ATTACK): ICD-10-CM

## 2025-02-14 DIAGNOSIS — G47.9 SLEEPING DIFFICULTY: ICD-10-CM

## 2025-02-14 DIAGNOSIS — R41.3 MEMORY LOSS: Primary | ICD-10-CM

## 2025-02-14 PROCEDURE — 1160F RVW MEDS BY RX/DR IN RCRD: CPT

## 2025-02-14 PROCEDURE — 99215 OFFICE O/P EST HI 40 MIN: CPT

## 2025-02-14 PROCEDURE — 1123F ACP DISCUSS/DSCN MKR DOCD: CPT

## 2025-02-14 PROCEDURE — 1126F AMNT PAIN NOTED NONE PRSNT: CPT

## 2025-02-14 PROCEDURE — 1159F MED LIST DOCD IN RCRD: CPT

## 2025-02-14 RX ORDER — TRAZODONE HYDROCHLORIDE 50 MG/1
50 TABLET, FILM COATED ORAL NIGHTLY
Qty: 30 TABLET | Refills: 5 | Status: SHIPPED | OUTPATIENT
Start: 2025-02-14

## 2025-02-14 ASSESSMENT — PATIENT HEALTH QUESTIONNAIRE - PHQ9
SUM OF ALL RESPONSES TO PHQ QUESTIONS 1-9: 0
1. LITTLE INTEREST OR PLEASURE IN DOING THINGS: NOT AT ALL
SUM OF ALL RESPONSES TO PHQ9 QUESTIONS 1 & 2: 0
SUM OF ALL RESPONSES TO PHQ QUESTIONS 1-9: 0
SUM OF ALL RESPONSES TO PHQ QUESTIONS 1-9: 0
2. FEELING DOWN, DEPRESSED OR HOPELESS: NOT AT ALL
SUM OF ALL RESPONSES TO PHQ QUESTIONS 1-9: 0

## 2025-02-14 NOTE — PROGRESS NOTES
1. Have you been to the ER, urgent care clinic since your last visit?  Hospitalized since your last visit?  No.    2. Have you seen or consulted any other health care providers outside of the [facility] since your last visit?  Include any pap smears or colon screening.   No.      Chief Complaint   Patient presents with    Transient Ischemic Attack     PCP advised she failed a memory test - pt denies any memory issues        
lb)   Height: 1.6 m (5' 3\")            General appearance: Patient is well-developed and well-nourished in no apparent distress and well groomed.    Psych/mental health:  Affect: Appropriate    PHQ       No data to display                HEENT:   Normocephalic  With evidence of trauma: No  Full range of motion head neck: Yes  Tenderness to palpation of the head neck region: No      Cardiovascular:     Extremities warm to touch: Yes  Extremity swelling: Minimal nonpitting edema in the right ankle  Discoloration: No  Evidence of PVD: No    Respiratory:   Dyspnea on exertion: No   Abnormal effort on casual observation: No   Use of portable oxygen: No   Evidence of cyanosis: No     Musculoskeletal:   Evidence of significant bone deformities: No   Spinal curvature: No     Integumentary:    Obvious bruising: No   Lacerations or discoloration on casual observation: No       Neurological Examination:   Mental Status:        MMSE  Failed to redirect to the Timeline version of the [facility] SmartLink.     Formal MoCA testing was completed today on 2/14/2025  there was nothing concerning on general observation and discussion.   Alert oriented and appropriate to general conversation  Normal processing on general observation  Followed conversation and responded seemingly appropriate throughout the office visit  No word finding difficulties noted on casual observation  Able to follow directions without difficulty       MoCA testing completed today on 2/14/2025  Visual-spatial/executive :4 /5  Naming: 3/3  Attention:  - Reading list of digits: 2/2   - With list of letters, tab hand at each A: 1/1  - Serial 7 substraction starting at 100: 3/3  Language:  - Repeat : 2/2  - Fluency/maximum words in 1 minute that began with F: 0/1  Abstraction: 1/2  Delayed recall: 3/5  Orientation: 6/6    Add 1 point <12 yr edu    TOTAL  26/30    Cranial Nerves:    EOMs intact gaze is conjugate  No nystagmus is appreciated  Facial motor intact

## 2025-02-14 NOTE — PATIENT INSTRUCTIONS
As per our discussion,    The test that we did today you scored barely below the normal limit.  There are many factors that may contribute to memory loss including vitamin deficiencies, lack of sleep, anxiety, depression, new onset of dementia.    Given you recently had a your labs checked including your vitamin B12, your vitamin D, your thyroid, and they were all normal, I will not repeat those levels today.    I will refer you to neuropsych for evaluation.    For your sleep, will start on a low-dose of trazodone in which you can take half tablet 30 minutes before bedtime and can increase to 1 tablet if needed to see if that helps.  If your symptoms persist, we may consider a referral to sleep study for evaluation.    I encouraged you to engage in approximately 2.5 hours of physician approved physical activity on a weekly basis.    To maintain a healthy diet. Also was informed of the Mediterranean diet, which has been associated with reduced risk for neurodegenerative conditions as well as heart disease.    I encouraged to maintain a cognitively stimulated lifestyle, also incorporating social interaction.    It was a pleasure to see you and meet your daughter today    Will see you back after the neuropsych evaluation.    Please do not hesitate to reach out for any questions or concerns

## 2025-02-15 PROBLEM — Z86.73 HISTORY OF TIA (TRANSIENT ISCHEMIC ATTACK): Status: ACTIVE | Noted: 2025-02-15

## 2025-02-15 PROBLEM — G47.9 SLEEPING DIFFICULTY: Status: ACTIVE | Noted: 2025-02-15

## 2025-02-15 PROBLEM — R41.3 MEMORY LOSS: Status: ACTIVE | Noted: 2025-02-15

## 2025-02-15 NOTE — ASSESSMENT & PLAN NOTE
Stable without any recurrence of TIA symptoms.    Her stroke risk factors include dyslipidemia    Patient is to continue aspirin 81 mg and rosuvastatin 5 mg daily.    Patient is to continue to work to all of her comorbid conditions as managed by PCP and other specialists as appropriate    RECOMMENDATIONS:  - BP goal is less than 140/90  - Goal HbA1c is less than 7.   - LDL less than 70     TIA education was provided today in regards to risk factors for stroke and lifestyle modifications to help minimize the risk of future stroke.    This included medication compliance, regular follow up with primary care physician,  and healthy lifestyle habits (nutrition/exercise).

## 2025-02-15 NOTE — ASSESSMENT & PLAN NOTE
Chronic in etiology and has been going on for the past 6 years.    Her symptoms did not reveal any indication for sleep apnea.    Will try patient on a low-dose of trazodone 50 mg in which she can take half tablet at nighttime for 2 weeks and increase to a full tablet thereafter.  Side effects reviewed.  This can be increased based on tolerability.    Sleep hygiene education was provided.

## 2025-02-15 NOTE — ASSESSMENT & PLAN NOTE
Patient was referred due to failed cognitive testing at PCPs office.    This could be multifactorial in etiology including sleeping difficulty, new onset of dementia (Alzheimer's vs. vascular), normal aging.    Her current neurological examination revealed mild cognitive impairment.    MoCA testing completed today and patient scored 26/30.    We will not repeat any brain imaging at this time given she had a brain MRI without contrast on 5/21/2024 which showed chronic microvascular ischemic disease with no acute infarction.    CTA head and neck showed no large vessel occlusion or hemodynamically significant stenosis.    Pertinent labs from 1/2/2025 showed LDL 75, A1c 5.9, TSH 1.78, T3 1.10, UA negative, vitamin D 41.1    Pertinent labs from 5/30/2024 showed B12 336.    Will refer patient to neuropsych for evaluation with Dr. Ley.    Patient was encouraged to engage in approximately 2.5 hours of physician approved physical activity on a weekly basis.    Patient was encouraged to maintain a healthy diet. Also was informed of the Mediterranean diet, which has been associated with reduced risk for neurodegenerative conditions as well as heart disease.    Patient was encouraged to maintain a cognitively stimulated lifestyle, also incorporating social interaction.

## 2025-08-14 ENCOUNTER — TRANSCRIBE ORDERS (OUTPATIENT)
Facility: HOSPITAL | Age: 65
End: 2025-08-14

## 2025-08-14 DIAGNOSIS — Z12.31 OTHER SCREENING MAMMOGRAM: Primary | ICD-10-CM

## 2025-09-03 ENCOUNTER — HOSPITAL ENCOUNTER (OUTPATIENT)
Facility: HOSPITAL | Age: 65
Setting detail: OUTPATIENT SURGERY
Discharge: HOME | End: 2025-09-03
Attending: STUDENT IN AN ORGANIZED HEALTH CARE EDUCATION/TRAINING PROGRAM | Admitting: STUDENT IN AN ORGANIZED HEALTH CARE EDUCATION/TRAINING PROGRAM
Payer: MEDICARE

## 2025-09-03 ENCOUNTER — ANESTHESIA (OUTPATIENT)
Facility: HOSPITAL | Age: 65
End: 2025-09-03
Payer: MEDICARE

## 2025-09-03 ENCOUNTER — ANESTHESIA EVENT (OUTPATIENT)
Facility: HOSPITAL | Age: 65
End: 2025-09-03
Payer: MEDICARE

## 2025-09-03 VITALS
DIASTOLIC BLOOD PRESSURE: 58 MMHG | RESPIRATION RATE: 14 BRPM | HEIGHT: 61 IN | TEMPERATURE: 97.6 F | SYSTOLIC BLOOD PRESSURE: 124 MMHG | OXYGEN SATURATION: 100 % | BODY MASS INDEX: 27.19 KG/M2 | WEIGHT: 144 LBS | HEART RATE: 56 BPM

## 2025-09-03 PROCEDURE — 7100000011 HC PHASE II RECOVERY - ADDTL 15 MIN: Performed by: STUDENT IN AN ORGANIZED HEALTH CARE EDUCATION/TRAINING PROGRAM

## 2025-09-03 PROCEDURE — 3600007512: Performed by: STUDENT IN AN ORGANIZED HEALTH CARE EDUCATION/TRAINING PROGRAM

## 2025-09-03 PROCEDURE — 3700000000 HC ANESTHESIA ATTENDED CARE: Performed by: STUDENT IN AN ORGANIZED HEALTH CARE EDUCATION/TRAINING PROGRAM

## 2025-09-03 PROCEDURE — 88305 TISSUE EXAM BY PATHOLOGIST: CPT

## 2025-09-03 PROCEDURE — 3600007502: Performed by: STUDENT IN AN ORGANIZED HEALTH CARE EDUCATION/TRAINING PROGRAM

## 2025-09-03 PROCEDURE — 2580000003 HC RX 258: Performed by: STUDENT IN AN ORGANIZED HEALTH CARE EDUCATION/TRAINING PROGRAM

## 2025-09-03 PROCEDURE — 7100000010 HC PHASE II RECOVERY - FIRST 15 MIN: Performed by: STUDENT IN AN ORGANIZED HEALTH CARE EDUCATION/TRAINING PROGRAM

## 2025-09-03 PROCEDURE — 2709999900 HC NON-CHARGEABLE SUPPLY: Performed by: STUDENT IN AN ORGANIZED HEALTH CARE EDUCATION/TRAINING PROGRAM

## 2025-09-03 PROCEDURE — 3700000001 HC ADD 15 MINUTES (ANESTHESIA): Performed by: STUDENT IN AN ORGANIZED HEALTH CARE EDUCATION/TRAINING PROGRAM

## 2025-09-03 PROCEDURE — 6360000002 HC RX W HCPCS: Performed by: NURSE ANESTHETIST, CERTIFIED REGISTERED

## 2025-09-03 RX ORDER — SODIUM CHLORIDE 9 MG/ML
25 INJECTION, SOLUTION INTRAVENOUS PRN
Status: DISCONTINUED | OUTPATIENT
Start: 2025-09-03 | End: 2025-09-03 | Stop reason: HOSPADM

## 2025-09-03 RX ORDER — SODIUM CHLORIDE 0.9 % (FLUSH) 0.9 %
5-40 SYRINGE (ML) INJECTION EVERY 12 HOURS SCHEDULED
Status: DISCONTINUED | OUTPATIENT
Start: 2025-09-03 | End: 2025-09-03 | Stop reason: HOSPADM

## 2025-09-03 RX ORDER — SODIUM CHLORIDE 0.9 % (FLUSH) 0.9 %
5-40 SYRINGE (ML) INJECTION PRN
Status: DISCONTINUED | OUTPATIENT
Start: 2025-09-03 | End: 2025-09-03 | Stop reason: HOSPADM

## 2025-09-03 RX ADMIN — PROPOFOL 230 MG: 10 INJECTION, EMULSION INTRAVENOUS at 12:08

## 2025-09-03 RX ADMIN — SODIUM CHLORIDE: 9 INJECTION, SOLUTION INTRAVENOUS at 11:37

## 2025-09-03 RX ADMIN — LIDOCAINE HYDROCHLORIDE 50 MG: 20 INJECTION, SOLUTION EPIDURAL; INFILTRATION; INTRACAUDAL; PERINEURAL at 11:46

## 2025-09-03 ASSESSMENT — PAIN - FUNCTIONAL ASSESSMENT: PAIN_FUNCTIONAL_ASSESSMENT: 0-10

## (undated) DEVICE — SET GRAV CK VLV NEEDLESS ST 3 GANGED 4WAY STPCOCK HI FLO 10

## (undated) DEVICE — ELECTRODE PT RET AD L9FT HI MOIST COND ADH HYDRGEL CORDED

## (undated) DEVICE — ENDOSCOPIC KIT COMPLIANCE ENDOKIT

## (undated) DEVICE — FORCEPS BX L240CM JAW DIA2.4MM ORNG L CAP W/ NDL DISP RAD

## (undated) DEVICE — TRAP ENDOSCP POLYP 2 CHMBR DRAWER TYP

## (undated) DEVICE — SNARE ENDOSCP POLYP MED 2.4 MM 240 CM 27 MM 2.8 MM SHT SENS

## (undated) DEVICE — CONTAINER SPEC 20 ML LID NEUT BUFF FORMALIN 10 % POLYPR STS

## (undated) DEVICE — CUFF BLD PRSS AD CLTH SGL TB W/ BAYNT CONN ROUNDED CORNER

## (undated) DEVICE — IV START KIT WITH SECUREMENT DEVICES

## (undated) DEVICE — TIP SUCT TRNSPAR RIB SURF STD BLB RIG NVENT W/ 5IN1 CONN DYND50138] MEDLINE INDUSTRIES INC]